# Patient Record
Sex: MALE | Race: WHITE | NOT HISPANIC OR LATINO | Employment: OTHER | URBAN - NONMETROPOLITAN AREA
[De-identification: names, ages, dates, MRNs, and addresses within clinical notes are randomized per-mention and may not be internally consistent; named-entity substitution may affect disease eponyms.]

---

## 2017-07-15 ENCOUNTER — HOSPITAL ENCOUNTER (EMERGENCY)
Facility: HOSPITAL | Age: 52
Discharge: HOME | End: 2017-07-15
Attending: FAMILY MEDICINE | Admitting: FAMILY MEDICINE

## 2017-07-15 VITALS
DIASTOLIC BLOOD PRESSURE: 88 MMHG | HEART RATE: 86 BPM | BODY MASS INDEX: 33.34 KG/M2 | WEIGHT: 220 LBS | TEMPERATURE: 98.2 F | RESPIRATION RATE: 18 BRPM | SYSTOLIC BLOOD PRESSURE: 126 MMHG | HEIGHT: 68 IN | OXYGEN SATURATION: 97 %

## 2017-07-15 DIAGNOSIS — R56.9 SEIZURE (HCC): Primary | ICD-10-CM

## 2017-07-15 LAB
ALBUMIN SERPL-MCNC: 4.2 G/DL (ref 3.4–4.8)
ALBUMIN/GLOB SERPL: 1.3 G/DL (ref 1.1–1.8)
ALP SERPL-CCNC: 105 U/L (ref 38–126)
ALT SERPL W P-5'-P-CCNC: 37 U/L (ref 21–72)
ANION GAP SERPL CALCULATED.3IONS-SCNC: 8 MMOL/L (ref 5–15)
AST SERPL-CCNC: 33 U/L (ref 17–59)
BASOPHILS # BLD AUTO: 0.04 10*3/MM3 (ref 0–0.2)
BASOPHILS NFR BLD AUTO: 0.2 % (ref 0–2)
BILIRUB SERPL-MCNC: 0.3 MG/DL (ref 0.2–1.3)
BILIRUB UR QL STRIP: NEGATIVE
BUN BLD-MCNC: 3 MG/DL (ref 7–21)
BUN/CREAT SERPL: 3 (ref 7–25)
CALCIUM SPEC-SCNC: 8.7 MG/DL (ref 8.4–10.2)
CHLORIDE SERPL-SCNC: 102 MMOL/L (ref 95–110)
CK SERPL-CCNC: 210 U/L (ref 55–170)
CLARITY UR: CLEAR
CO2 SERPL-SCNC: 27 MMOL/L (ref 22–31)
COLOR UR: YELLOW
CREAT BLD-MCNC: 1.01 MG/DL (ref 0.7–1.3)
DEPRECATED RDW RBC AUTO: 44.5 FL (ref 35.1–43.9)
EOSINOPHIL # BLD AUTO: 0.27 10*3/MM3 (ref 0–0.7)
EOSINOPHIL NFR BLD AUTO: 1.6 % (ref 0–7)
ERYTHROCYTE [DISTWIDTH] IN BLOOD BY AUTOMATED COUNT: 13.7 % (ref 11.5–14.5)
GFR SERPL CREATININE-BSD FRML MDRD: 78 ML/MIN/1.73 (ref 56–130)
GLOBULIN UR ELPH-MCNC: 3.3 GM/DL (ref 2.3–3.5)
GLUCOSE BLD-MCNC: 104 MG/DL (ref 60–100)
GLUCOSE UR STRIP-MCNC: NEGATIVE MG/DL
HCT VFR BLD AUTO: 42.7 % (ref 39–49)
HGB BLD-MCNC: 15.1 G/DL (ref 13.7–17.3)
HGB UR QL STRIP.AUTO: NEGATIVE
IMM GRANULOCYTES # BLD: 0.06 10*3/MM3 (ref 0–0.02)
IMM GRANULOCYTES NFR BLD: 0.4 % (ref 0–0.5)
KETONES UR QL STRIP: NEGATIVE
LEUKOCYTE ESTERASE UR QL STRIP.AUTO: NEGATIVE
LYMPHOCYTES # BLD AUTO: 2.5 10*3/MM3 (ref 0.6–4.2)
LYMPHOCYTES NFR BLD AUTO: 15.1 % (ref 10–50)
MCH RBC QN AUTO: 31.2 PG (ref 26.5–34)
MCHC RBC AUTO-ENTMCNC: 35.4 G/DL (ref 31.5–36.3)
MCV RBC AUTO: 88.2 FL (ref 80–98)
MONOCYTES # BLD AUTO: 1.08 10*3/MM3 (ref 0–0.9)
MONOCYTES NFR BLD AUTO: 6.5 % (ref 0–12)
NEUTROPHILS # BLD AUTO: 12.66 10*3/MM3 (ref 2–8.6)
NEUTROPHILS NFR BLD AUTO: 76.2 % (ref 37–80)
NITRITE UR QL STRIP: NEGATIVE
PH UR STRIP.AUTO: 6 [PH] (ref 5–9)
PLATELET # BLD AUTO: 264 10*3/MM3 (ref 150–450)
PMV BLD AUTO: 9.8 FL (ref 8–12)
POTASSIUM BLD-SCNC: 3.3 MMOL/L (ref 3.5–5.1)
PROT SERPL-MCNC: 7.5 G/DL (ref 6.3–8.6)
PROT UR QL STRIP: NEGATIVE
RBC # BLD AUTO: 4.84 10*6/MM3 (ref 4.37–5.74)
SODIUM BLD-SCNC: 137 MMOL/L (ref 137–145)
SP GR UR STRIP: 1.01 (ref 1–1.03)
UROBILINOGEN UR QL STRIP: NORMAL
WBC NRBC COR # BLD: 16.61 10*3/MM3 (ref 3.2–9.8)

## 2017-07-15 PROCEDURE — 81003 URINALYSIS AUTO W/O SCOPE: CPT | Performed by: FAMILY MEDICINE

## 2017-07-15 PROCEDURE — 82550 ASSAY OF CK (CPK): CPT | Performed by: FAMILY MEDICINE

## 2017-07-15 PROCEDURE — 80053 COMPREHEN METABOLIC PANEL: CPT | Performed by: FAMILY MEDICINE

## 2017-07-15 PROCEDURE — 85025 COMPLETE CBC W/AUTO DIFF WBC: CPT | Performed by: FAMILY MEDICINE

## 2017-07-15 PROCEDURE — 96374 THER/PROPH/DIAG INJ IV PUSH: CPT

## 2017-07-15 PROCEDURE — 99284 EMERGENCY DEPT VISIT MOD MDM: CPT

## 2017-07-15 PROCEDURE — 25010000002 LEVETIRACETAM IN NACL 0.82% 500 MG/100ML SOLUTION: Performed by: FAMILY MEDICINE

## 2017-07-15 PROCEDURE — 96361 HYDRATE IV INFUSION ADD-ON: CPT

## 2017-07-15 RX ORDER — LEVETIRACETAM 5 MG/ML
500 INJECTION INTRAVASCULAR EVERY 12 HOURS SCHEDULED
Status: DISCONTINUED | OUTPATIENT
Start: 2017-07-15 | End: 2017-07-15 | Stop reason: HOSPADM

## 2017-07-15 RX ORDER — LISINOPRIL 10 MG/1
10 TABLET ORAL DAILY
COMMUNITY
End: 2020-04-17

## 2017-07-15 RX ORDER — LEVETIRACETAM 500 MG/1
500 TABLET ORAL 2 TIMES DAILY
Qty: 60 TABLET | Refills: 0 | Status: SHIPPED | OUTPATIENT
Start: 2017-07-15

## 2017-07-15 RX ADMIN — LEVETIRACETAM 500 MG: 5 INJECTION INTRAVENOUS at 14:25

## 2017-07-15 RX ADMIN — SODIUM CHLORIDE 1000 ML: 9 INJECTION, SOLUTION INTRAVENOUS at 14:25

## 2017-07-15 NOTE — ED PROVIDER NOTES
Subjective   HPI Comments: Pt with seizure history and had Keppra stopped around 8 months ago by PCP since patient was not having any more seizures.     Patient is a 52 y.o. male presenting with seizures.   Seizures   Seizure activity on arrival: no    Seizure type:  Grand mal  Preceding symptoms: dizziness and vision change    Initial focality:  None  Episode characteristics: abnormal movements and tongue biting    Postictal symptoms: memory loss (during seizure)    Postictal symptoms: no confusion and no somnolence    Return to baseline: yes    Severity:  Mild  Timing:  Once  Number of seizures this episode:  1  Progression:  Resolved  Context: not alcohol withdrawal, not drug use, not fever and not previous head injury        Review of Systems   Constitutional: Negative for appetite change, chills, diaphoresis, fatigue and fever.   HENT: Negative for congestion, ear discharge, ear pain, nosebleeds, rhinorrhea, sinus pressure, sore throat and trouble swallowing.    Eyes: Negative for discharge and redness.   Respiratory: Negative for apnea, cough, chest tightness, shortness of breath and wheezing.    Cardiovascular: Negative for chest pain.   Gastrointestinal: Negative for abdominal pain, diarrhea, nausea and vomiting.   Endocrine: Negative for polyuria.   Genitourinary: Negative for dysuria, frequency and urgency.   Musculoskeletal: Negative for myalgias and neck pain.   Skin: Negative for color change and rash.   Allergic/Immunologic: Negative for immunocompromised state.   Neurological: Positive for seizures. Negative for dizziness, syncope, weakness, light-headedness and headaches.   Hematological: Negative for adenopathy. Does not bruise/bleed easily.   Psychiatric/Behavioral: Negative for behavioral problems and confusion.   All other systems reviewed and are negative.      Past Medical History:   Diagnosis Date   • Hyperlipidemia    • Hypertension    • Seizures        Allergies   Allergen Reactions   •  Gabapentin    • Hydrocodone    • Simvastatin        Past Surgical History:   Procedure Laterality Date   • BRAIN SURGERY         History reviewed. No pertinent family history.    Social History     Social History   • Marital status:      Spouse name: N/A   • Number of children: N/A   • Years of education: N/A     Social History Main Topics   • Smoking status: Current Every Day Smoker     Packs/day: 0.50     Types: Cigarettes   • Smokeless tobacco: None   • Alcohol use No   • Drug use: No   • Sexual activity: Defer     Other Topics Concern   • None     Social History Narrative    Pt lives by himself, unemployed           Objective   Physical Exam   Constitutional: He is oriented to person, place, and time. He appears well-developed and well-nourished.   HENT:   Head: Normocephalic and atraumatic.   Nose: Nose normal.   Mouth/Throat: Oropharynx is clear and moist.   Eyes: Conjunctivae and EOM are normal. Pupils are equal, round, and reactive to light. Right eye exhibits no discharge. Left eye exhibits no discharge. No scleral icterus.   Neck: Normal range of motion. Neck supple. No tracheal deviation present.   Cardiovascular: Normal rate, regular rhythm and normal heart sounds.    No murmur heard.  Pulmonary/Chest: Effort normal and breath sounds normal. No stridor. No respiratory distress. He has no wheezes. He has no rales.   Abdominal: Soft. Bowel sounds are normal. He exhibits no distension and no mass. There is no tenderness. There is no rebound and no guarding.   Musculoskeletal: He exhibits no edema.   Neurological: He is alert and oriented to person, place, and time. Coordination normal.   Skin: Skin is warm and dry. No rash noted. No erythema.   Psychiatric: He has a normal mood and affect. His behavior is normal. Thought content normal.   Nursing note and vitals reviewed.      Procedures         ED Course  ED Course        Labs Reviewed   COMPREHENSIVE METABOLIC PANEL - Abnormal; Notable for the  following:        Result Value    Glucose 104 (*)     BUN 3 (*)     Potassium 3.3 (*)     BUN/Creatinine Ratio 3.0 (*)     All other components within normal limits   CK - Abnormal; Notable for the following:     Creatine Kinase 210 (*)     All other components within normal limits   CBC WITH AUTO DIFFERENTIAL - Abnormal; Notable for the following:     WBC 16.61 (*)     RDW-SD 44.5 (*)     Neutrophils, Absolute 12.66 (*)     Monocytes, Absolute 1.08 (*)     Immature Grans, Absolute 0.06 (*)     All other components within normal limits   URINALYSIS W/ CULTURE IF INDICATED - Normal    Narrative:     Urine microscopic not indicated.   CBC AND DIFFERENTIAL    Narrative:     The following orders were created for panel order CBC & Differential.  Procedure                               Abnormality         Status                     ---------                               -----------         ------                     CBC Auto Differential[[id number]]        Abnormal            Final result                 Please view results for these tests on the individual orders.       No orders to display                   MDM    Final diagnoses:   Seizure            Dionicio Wayne MD  07/15/17 1551       Dionicio Wayne MD  07/15/17 1552       Dionicio Wayne MD  07/15/17 1558

## 2017-07-15 NOTE — ED TRIAGE NOTES
Pt presents to the ED via EMS with c/o seizure.  Per EMS pt was postictal upon arrival.  Pt is alert and oriented, but unable to remember much of the morning, or much of his personal medical history.  Pt has no complaints at this time.  Pt noted to be diaphoretic upon arrival..

## 2018-05-16 ENCOUNTER — HOSPITAL ENCOUNTER (EMERGENCY)
Facility: HOSPITAL | Age: 53
Discharge: HOME | End: 2018-05-16
Attending: EMERGENCY MEDICINE | Admitting: EMERGENCY MEDICINE

## 2018-05-16 ENCOUNTER — APPOINTMENT (OUTPATIENT)
Dept: CT IMAGING | Facility: HOSPITAL | Age: 53
End: 2018-05-16

## 2018-05-16 VITALS
DIASTOLIC BLOOD PRESSURE: 94 MMHG | HEART RATE: 75 BPM | SYSTOLIC BLOOD PRESSURE: 139 MMHG | OXYGEN SATURATION: 98 % | BODY MASS INDEX: 30.31 KG/M2 | HEIGHT: 68 IN | TEMPERATURE: 98 F | RESPIRATION RATE: 18 BRPM | WEIGHT: 200 LBS

## 2018-05-16 DIAGNOSIS — R25.1 TREMOR: ICD-10-CM

## 2018-05-16 DIAGNOSIS — E87.6 HYPOKALEMIA: ICD-10-CM

## 2018-05-16 DIAGNOSIS — G24.01 TARDIVE DYSKINESIA: Primary | ICD-10-CM

## 2018-05-16 LAB
ALBUMIN SERPL-MCNC: 4.5 G/DL (ref 3.4–4.8)
ALBUMIN/GLOB SERPL: 1.3 G/DL (ref 1.1–1.8)
ALP SERPL-CCNC: 105 U/L (ref 38–126)
ALT SERPL W P-5'-P-CCNC: 42 U/L (ref 21–72)
AMPHET+METHAMPHET UR QL: NEGATIVE
ANION GAP SERPL CALCULATED.3IONS-SCNC: 11 MMOL/L (ref 5–15)
APAP SERPL-MCNC: <10 MCG/ML (ref 10–30)
AST SERPL-CCNC: 33 U/L (ref 17–59)
BARBITURATES UR QL SCN: NEGATIVE
BASOPHILS # BLD AUTO: 0.04 10*3/MM3 (ref 0–0.2)
BASOPHILS NFR BLD AUTO: 0.5 % (ref 0–2)
BENZODIAZ UR QL SCN: NEGATIVE
BILIRUB SERPL-MCNC: 0.6 MG/DL (ref 0.2–1.3)
BILIRUB UR QL STRIP: NEGATIVE
BUN BLD-MCNC: 4 MG/DL (ref 7–21)
BUN/CREAT SERPL: 4.5 (ref 7–25)
CALCIUM SPEC-SCNC: 9.3 MG/DL (ref 8.4–10.2)
CANNABINOIDS SERPL QL: NEGATIVE
CHLORIDE SERPL-SCNC: 99 MMOL/L (ref 95–110)
CLARITY UR: CLEAR
CO2 SERPL-SCNC: 32 MMOL/L (ref 22–31)
COCAINE UR QL: NEGATIVE
COLOR UR: YELLOW
CREAT BLD-MCNC: 0.88 MG/DL (ref 0.7–1.3)
DEPRECATED RDW RBC AUTO: 39.1 FL (ref 35.1–43.9)
EOSINOPHIL # BLD AUTO: 0.2 10*3/MM3 (ref 0–0.7)
EOSINOPHIL NFR BLD AUTO: 2.6 % (ref 0–7)
ERYTHROCYTE [DISTWIDTH] IN BLOOD BY AUTOMATED COUNT: 12.2 % (ref 11.5–14.5)
ETHANOL BLD-MCNC: <10 MG/DL (ref 0–10)
ETHANOL UR QL: <0.01 %
GFR SERPL CREATININE-BSD FRML MDRD: 91 ML/MIN/1.73 (ref 56–130)
GLOBULIN UR ELPH-MCNC: 3.4 GM/DL (ref 2.3–3.5)
GLUCOSE BLD-MCNC: 99 MG/DL (ref 60–100)
GLUCOSE UR STRIP-MCNC: NEGATIVE MG/DL
HCT VFR BLD AUTO: 45.3 % (ref 39–49)
HGB BLD-MCNC: 16.7 G/DL (ref 13.7–17.3)
HGB UR QL STRIP.AUTO: NEGATIVE
HOLD SPECIMEN: NORMAL
HOLD SPECIMEN: NORMAL
IMM GRANULOCYTES # BLD: 0.01 10*3/MM3 (ref 0–0.02)
IMM GRANULOCYTES NFR BLD: 0.1 % (ref 0–0.5)
KETONES UR QL STRIP: NEGATIVE
LEUKOCYTE ESTERASE UR QL STRIP.AUTO: NEGATIVE
LYMPHOCYTES # BLD AUTO: 2.67 10*3/MM3 (ref 0.6–4.2)
LYMPHOCYTES NFR BLD AUTO: 35.3 % (ref 10–50)
MAGNESIUM SERPL-MCNC: 2 MG/DL (ref 1.6–2.3)
MCH RBC QN AUTO: 32.6 PG (ref 26.5–34)
MCHC RBC AUTO-ENTMCNC: 36.9 G/DL (ref 31.5–36.3)
MCV RBC AUTO: 88.5 FL (ref 80–98)
METHADONE UR QL SCN: NEGATIVE
MONOCYTES # BLD AUTO: 0.51 10*3/MM3 (ref 0–0.9)
MONOCYTES NFR BLD AUTO: 6.7 % (ref 0–12)
NEUTROPHILS # BLD AUTO: 4.13 10*3/MM3 (ref 2–8.6)
NEUTROPHILS NFR BLD AUTO: 54.8 % (ref 37–80)
NITRITE UR QL STRIP: NEGATIVE
OPIATES UR QL: NEGATIVE
OXYCODONE UR QL SCN: NEGATIVE
PH UR STRIP.AUTO: 7 [PH] (ref 5–9)
PLATELET # BLD AUTO: 238 10*3/MM3 (ref 150–450)
PMV BLD AUTO: 11.1 FL (ref 8–12)
POTASSIUM BLD-SCNC: 3 MMOL/L (ref 3.5–5.1)
PROT SERPL-MCNC: 7.9 G/DL (ref 6.3–8.6)
PROT UR QL STRIP: NEGATIVE
RBC # BLD AUTO: 5.12 10*6/MM3 (ref 4.37–5.74)
SALICYLATES SERPL-MCNC: <1 MG/DL (ref 10–20)
SODIUM BLD-SCNC: 142 MMOL/L (ref 137–145)
SP GR UR STRIP: 1 (ref 1–1.03)
T4 FREE SERPL-MCNC: 0.98 NG/DL (ref 0.78–2.19)
TSH SERPL DL<=0.05 MIU/L-ACNC: 2.72 MIU/ML (ref 0.46–4.68)
UROBILINOGEN UR QL STRIP: NORMAL
WBC NRBC COR # BLD: 7.56 10*3/MM3 (ref 3.2–9.8)
WHOLE BLOOD HOLD SPECIMEN: NORMAL
WHOLE BLOOD HOLD SPECIMEN: NORMAL

## 2018-05-16 PROCEDURE — 96372 THER/PROPH/DIAG INJ SC/IM: CPT

## 2018-05-16 PROCEDURE — 80307 DRUG TEST PRSMV CHEM ANLYZR: CPT | Performed by: EMERGENCY MEDICINE

## 2018-05-16 PROCEDURE — 84439 ASSAY OF FREE THYROXINE: CPT | Performed by: EMERGENCY MEDICINE

## 2018-05-16 PROCEDURE — 81003 URINALYSIS AUTO W/O SCOPE: CPT | Performed by: EMERGENCY MEDICINE

## 2018-05-16 PROCEDURE — 70450 CT HEAD/BRAIN W/O DYE: CPT

## 2018-05-16 PROCEDURE — 84443 ASSAY THYROID STIM HORMONE: CPT | Performed by: EMERGENCY MEDICINE

## 2018-05-16 PROCEDURE — 25010000002 BENZTROPINE MESYLATE PER 1 MG: Performed by: EMERGENCY MEDICINE

## 2018-05-16 PROCEDURE — 83735 ASSAY OF MAGNESIUM: CPT | Performed by: EMERGENCY MEDICINE

## 2018-05-16 PROCEDURE — 85025 COMPLETE CBC W/AUTO DIFF WBC: CPT | Performed by: EMERGENCY MEDICINE

## 2018-05-16 PROCEDURE — 80053 COMPREHEN METABOLIC PANEL: CPT | Performed by: EMERGENCY MEDICINE

## 2018-05-16 PROCEDURE — 99284 EMERGENCY DEPT VISIT MOD MDM: CPT

## 2018-05-16 RX ORDER — POTASSIUM CHLORIDE 750 MG/1
40 CAPSULE, EXTENDED RELEASE ORAL ONCE
Status: COMPLETED | OUTPATIENT
Start: 2018-05-16 | End: 2018-05-16

## 2018-05-16 RX ORDER — BENZTROPINE MESYLATE 1 MG/ML
2 INJECTION INTRAMUSCULAR; INTRAVENOUS ONCE
Status: COMPLETED | OUTPATIENT
Start: 2018-05-16 | End: 2018-05-16

## 2018-05-16 RX ORDER — BENZTROPINE MESYLATE 1 MG/1
1 TABLET ORAL 2 TIMES DAILY PRN
Qty: 20 TABLET | Refills: 0 | Status: SHIPPED | OUTPATIENT
Start: 2018-05-16 | End: 2020-04-06 | Stop reason: SDUPTHER

## 2018-05-16 RX ORDER — SODIUM CHLORIDE 0.9 % (FLUSH) 0.9 %
10 SYRINGE (ML) INJECTION AS NEEDED
Status: DISCONTINUED | OUTPATIENT
Start: 2018-05-16 | End: 2018-05-16 | Stop reason: HOSPADM

## 2018-05-16 RX ADMIN — POTASSIUM CHLORIDE 40 MEQ: 750 CAPSULE, EXTENDED RELEASE ORAL at 11:47

## 2018-05-16 RX ADMIN — BENZTROPINE MESYLATE 2 MG: 1 INJECTION INTRAMUSCULAR; INTRAVENOUS at 10:19

## 2018-05-16 NOTE — ED NOTES
Patient states that he has a history of seizures, patient states his last seizure was 4 months ago. Patient reports his tremors began 3 days ago. Patient also states he doesn't know if the tremors happened previously before or not.     Evelin Pascual RN  05/16/18 1072

## 2018-05-16 NOTE — ED PROVIDER NOTES
Subjective   Patient presents with 3 days of a tremor.  Patient notes these and the face tongue and bilateral extremity right greater than left.  Patient states that he is on several psychiatric medications, we do not have a complete list here.  Patient [facility] a [facility] Baltazar [facility] [place] [facility]  At phone #[phone].  This is evidently the patient's psychiatrist.  The patient states his physician referred him to the ED for further evaluation.  Patient denies any nausea vomiting fevers chills bowel or bladder problems on his current regime.  Patient notes no recent changes.            Review of Systems   Constitutional: Negative.  Negative for appetite change, chills and fever.   HENT: Negative.  Negative for congestion.    Eyes: Negative.  Negative for photophobia and visual disturbance.   Respiratory: Negative.  Negative for cough, chest tightness and shortness of breath.    Cardiovascular: Negative.  Negative for chest pain and palpitations.   Gastrointestinal: Negative.  Negative for abdominal pain, constipation, diarrhea, nausea and vomiting.   Endocrine: Negative.    Genitourinary: Negative.  Negative for decreased urine volume, dysuria, flank pain and hematuria.   Musculoskeletal: Negative.  Negative for arthralgias, back pain, myalgias, neck pain and neck stiffness.   Skin: Negative.  Negative for pallor.   Neurological: Positive for tremors. Negative for dizziness, syncope, weakness, light-headedness, numbness and headaches.   Psychiatric/Behavioral: Negative.  Negative for confusion and suicidal ideas. The patient is not nervous/anxious.        Past Medical History:   Diagnosis Date   • Hyperlipidemia    • Hypertension    • Seizures        Allergies   Allergen Reactions   • Gabapentin    • Hydrocodone    • Simvastatin        Past Surgical History:   Procedure Laterality Date   • BRAIN SURGERY         History reviewed. No pertinent family history.    Social History     Social History   • Marital  status: Single     Social History Main Topics   • Smoking status: Current Every Day Smoker     Packs/day: 0.50     Types: Cigarettes   • Alcohol use No   • Drug use: No   • Sexual activity: Defer     Other Topics Concern   • Not on file     Social History Narrative    Pt lives by himself, unemployed           Objective   Physical Exam   Constitutional: He is oriented to person, place, and time. He appears well-developed and well-nourished. No distress.   HENT:   Head: Normocephalic and atraumatic.   Eyes: Conjunctivae and EOM are normal.   Neck: Normal range of motion. Neck supple. No JVD present.   Cardiovascular: Normal rate, regular rhythm, normal heart sounds and intact distal pulses.  Exam reveals no gallop and no friction rub.    No murmur heard.  Pulmonary/Chest: Effort normal. No respiratory distress. He has no wheezes. He has no rales. He exhibits no tenderness.   Abdominal: Soft. Bowel sounds are normal. He exhibits no distension and no mass. There is no tenderness. There is no rebound and no guarding.   Musculoskeletal: He exhibits no tenderness or deformity.   She will with consistent tremor of the right and left upper extremity, right greater than left.  Patient also with a a repetitive the tongue tremor and the right greater than left side of the facial tremor.  Patient does have purposeful motion without neurological deficits.   Neurological: He is alert and oriented to person, place, and time.   Skin: Skin is warm and dry.   Psychiatric: He has a normal mood and affect. His behavior is normal. Judgment and thought content normal.   Nursing note and vitals reviewed.      Procedures           ED Course      Labs Reviewed   COMPREHENSIVE METABOLIC PANEL - Abnormal; Notable for the following:        Result Value    BUN 4 (*)     Potassium 3.0 (*)     CO2 32.0 (*)     BUN/Creatinine Ratio 4.5 (*)     All other components within normal limits   CBC WITH AUTO DIFFERENTIAL - Abnormal; Notable for the  following:     MCHC 36.9 (*)     All other components within normal limits   ACETAMINOPHEN LEVEL - Abnormal; Notable for the following:     Acetaminophen <10.0 (*)     All other components within normal limits   SALICYLATE LEVEL - Abnormal; Notable for the following:     Salicylate <1.0 (*)     All other components within normal limits   MAGNESIUM - Normal   TSH - Normal   T4, FREE - Normal   URINALYSIS W/ CULTURE IF INDICATED - Normal    Narrative:     Urine microscopic not indicated.   URINE DRUG SCREEN - Normal    Narrative:     Negative Thresholds For Drugs Screened in Urine:     Amphetamines          500 ng/ml  Barbiturates          200 ng/ml  Benzodiazepines       200 ng/ml  Cocaine               150 ng/ml  Methadone             300 ng/mL  Opiates               300 ng/mL  Oxycodone             100 ng/mL  THC                   20 ng/mL    The normal value for all drugs tested is negative. This report includes final unconfirmed screening results.  A positive result by this assay can be, at your request, sent to the Reference Lab for confirmation by gas chromatography. Unconfirmed results must not be used for non-medical purposes, such as employment or legal testing. Clinical consideration should be applied to any drug of abuse test result, particularly when unconfirmed results are used.   RAINBOW DRAW    Narrative:     The following orders were created for panel order [place] Draw.  Procedure                               Abnormality         Status                     ---------                               -----------         ------                     Light Blue Top[[id number]]                                   Final result               Green Top (Gel)[[id number]]                                  Final result               Lavender Top[[id number]]                                     Final result               Gold Top - SST[[id number]]                                   Final result                 Please view  results for these tests on the individual orders.   ETHANOL   CBC AND DIFFERENTIAL    Narrative:     The following orders were created for panel order CBC & Differential.  Procedure                               Abnormality         Status                     ---------                               -----------         ------                     CBC Auto Differential[[id number]]        Abnormal            Final result                 Please view results for these tests on the individual orders.   LIGHT BLUE TOP   GREEN TOP   LAVENDER TOP   GOLD TOP - SST       CT Head Without Contrast   Final Result   CONCLUSION:   No acute process.   Minimal cerebral atrophy.      [id number]      Electronically signed by:  Amauri Mcneill MD  5/16/2018 1:13 PM CDT   Workstation: [email or web address]          [facility], in discussion with Dr. Seth Colby, [phone].  The suggest medication titration which will happen on Wednesday, the patient has appointment [facility].  In the meantime patient will be called by Hailey this afternoon to try to schedule an earlier appointment time and.  Patient also be started on low-dose Cogentin for the symptoms to help give him rest.  They will also arrange neurology follow-up with the patient's symptoms.            [facility]      Final diagnoses:   Tardive dyskinesia   Tremor   Hypokalemia            Danny Story MD  05/16/18 3750

## 2018-05-16 NOTE — DISCHARGE INSTRUCTIONS
Your case was discussed with Dr. Colby and his assistant, Hailey.  She will be calling you to schedule an appointment time.  It would like you to continue current medications.  They would like you to start the new medication, Cogentin, to help with the tremors.  They will also be arranging neurology consultation and follow-up for you.

## 2018-10-07 ENCOUNTER — APPOINTMENT (OUTPATIENT)
Dept: CT IMAGING | Facility: HOSPITAL | Age: 53
End: 2018-10-07

## 2018-10-07 ENCOUNTER — HOSPITAL ENCOUNTER (EMERGENCY)
Facility: HOSPITAL | Age: 53
Discharge: HOME | End: 2018-10-07
Attending: EMERGENCY MEDICINE | Admitting: EMERGENCY MEDICINE

## 2018-10-07 VITALS
HEART RATE: 75 BPM | DIASTOLIC BLOOD PRESSURE: 97 MMHG | WEIGHT: 198 LBS | SYSTOLIC BLOOD PRESSURE: 165 MMHG | OXYGEN SATURATION: 97 % | RESPIRATION RATE: 18 BRPM | TEMPERATURE: 97.8 F | HEIGHT: 69 IN | BODY MASS INDEX: 29.33 KG/M2

## 2018-10-07 DIAGNOSIS — R25.1 TREMOR: Primary | ICD-10-CM

## 2018-10-07 DIAGNOSIS — R42 DIZZINESS: ICD-10-CM

## 2018-10-07 LAB
ALBUMIN SERPL-MCNC: 3.9 G/DL (ref 3.4–4.8)
ALBUMIN/GLOB SERPL: 1.3 G/DL (ref 1.1–1.8)
ALP SERPL-CCNC: 100 U/L (ref 38–126)
ALT SERPL W P-5'-P-CCNC: 41 U/L (ref 21–72)
AMPHET+METHAMPHET UR QL: NEGATIVE
ANION GAP SERPL CALCULATED.3IONS-SCNC: 11 MMOL/L (ref 5–15)
APAP SERPL-MCNC: <10 MCG/ML (ref 10–30)
AST SERPL-CCNC: 43 U/L (ref 17–59)
BARBITURATES UR QL SCN: NEGATIVE
BASOPHILS # BLD AUTO: 0.06 10*3/MM3 (ref 0–0.2)
BASOPHILS NFR BLD AUTO: 0.6 % (ref 0–2)
BENZODIAZ UR QL SCN: POSITIVE
BILIRUB SERPL-MCNC: 0.7 MG/DL (ref 0.2–1.3)
BILIRUB UR QL STRIP: NEGATIVE
BUN BLD-MCNC: <2 MG/DL (ref 7–21)
BUN/CREAT SERPL: ABNORMAL (ref 7–25)
CALCIUM SPEC-SCNC: 8.7 MG/DL (ref 8.4–10.2)
CANNABINOIDS SERPL QL: NEGATIVE
CHLORIDE SERPL-SCNC: 94 MMOL/L (ref 95–110)
CLARITY UR: CLEAR
CO2 SERPL-SCNC: 34 MMOL/L (ref 22–31)
COCAINE UR QL: NEGATIVE
COLOR UR: YELLOW
CREAT BLD-MCNC: 0.98 MG/DL (ref 0.7–1.3)
DEPRECATED RDW RBC AUTO: 41.3 FL (ref 35.1–43.9)
EOSINOPHIL # BLD AUTO: 0.56 10*3/MM3 (ref 0–0.7)
EOSINOPHIL NFR BLD AUTO: 5.7 % (ref 0–7)
ERYTHROCYTE [DISTWIDTH] IN BLOOD BY AUTOMATED COUNT: 12.8 % (ref 11.5–14.5)
ETHANOL BLD-MCNC: <10 MG/DL (ref 0–10)
ETHANOL UR QL: <0.01 %
GFR SERPL CREATININE-BSD FRML MDRD: 80 ML/MIN/1.73 (ref 56–130)
GLOBULIN UR ELPH-MCNC: 3 GM/DL (ref 2.3–3.5)
GLUCOSE BLD-MCNC: 127 MG/DL (ref 60–100)
GLUCOSE UR STRIP-MCNC: NEGATIVE MG/DL
HCT VFR BLD AUTO: 45.9 % (ref 39–49)
HGB BLD-MCNC: 16.4 G/DL (ref 13.7–17.3)
HGB UR QL STRIP.AUTO: NEGATIVE
HOLD SPECIMEN: NORMAL
HOLD SPECIMEN: NORMAL
IMM GRANULOCYTES # BLD: 0.01 10*3/MM3 (ref 0–0.02)
IMM GRANULOCYTES NFR BLD: 0.1 % (ref 0–0.5)
KETONES UR QL STRIP: NEGATIVE
LEUKOCYTE ESTERASE UR QL STRIP.AUTO: NEGATIVE
LYMPHOCYTES # BLD AUTO: 3.16 10*3/MM3 (ref 0.6–4.2)
LYMPHOCYTES NFR BLD AUTO: 31.9 % (ref 10–50)
MCH RBC QN AUTO: 31.4 PG (ref 26.5–34)
MCHC RBC AUTO-ENTMCNC: 35.7 G/DL (ref 31.5–36.3)
MCV RBC AUTO: 87.9 FL (ref 80–98)
METHADONE UR QL SCN: NEGATIVE
MONOCYTES # BLD AUTO: 0.42 10*3/MM3 (ref 0–0.9)
MONOCYTES NFR BLD AUTO: 4.2 % (ref 0–12)
NEUTROPHILS # BLD AUTO: 5.7 10*3/MM3 (ref 2–8.6)
NEUTROPHILS NFR BLD AUTO: 57.5 % (ref 37–80)
NITRITE UR QL STRIP: NEGATIVE
NRBC BLD MANUAL-RTO: 0 /100 WBC (ref 0–0)
OPIATES UR QL: NEGATIVE
OXYCODONE UR QL SCN: NEGATIVE
PH UR STRIP.AUTO: 7 [PH] (ref 5–9)
PLATELET # BLD AUTO: 246 10*3/MM3 (ref 150–450)
PMV BLD AUTO: 10.4 FL (ref 8–12)
POTASSIUM BLD-SCNC: 2.6 MMOL/L (ref 3.5–5.1)
PROT SERPL-MCNC: 6.9 G/DL (ref 6.3–8.6)
PROT UR QL STRIP: NEGATIVE
RBC # BLD AUTO: 5.22 10*6/MM3 (ref 4.37–5.74)
SALICYLATES SERPL-MCNC: <1 MG/DL (ref 10–20)
SODIUM BLD-SCNC: 139 MMOL/L (ref 137–145)
SP GR UR STRIP: 1.01 (ref 1–1.03)
UROBILINOGEN UR QL STRIP: NORMAL
WBC NRBC COR # BLD: 9.91 10*3/MM3 (ref 3.2–9.8)
WHOLE BLOOD HOLD SPECIMEN: NORMAL
WHOLE BLOOD HOLD SPECIMEN: NORMAL

## 2018-10-07 PROCEDURE — 80307 DRUG TEST PRSMV CHEM ANLYZR: CPT | Performed by: EMERGENCY MEDICINE

## 2018-10-07 PROCEDURE — 99283 EMERGENCY DEPT VISIT LOW MDM: CPT

## 2018-10-07 PROCEDURE — 70450 CT HEAD/BRAIN W/O DYE: CPT

## 2018-10-07 PROCEDURE — 81003 URINALYSIS AUTO W/O SCOPE: CPT | Performed by: EMERGENCY MEDICINE

## 2018-10-07 PROCEDURE — 85025 COMPLETE CBC W/AUTO DIFF WBC: CPT | Performed by: EMERGENCY MEDICINE

## 2018-10-07 PROCEDURE — 80053 COMPREHEN METABOLIC PANEL: CPT | Performed by: EMERGENCY MEDICINE

## 2018-10-07 RX ORDER — LORAZEPAM 1 MG/1
2 TABLET ORAL ONCE
Status: COMPLETED | OUTPATIENT
Start: 2018-10-07 | End: 2018-10-07

## 2018-10-07 RX ORDER — MECLIZINE HYDROCHLORIDE 25 MG/1
50 TABLET ORAL ONCE
Status: COMPLETED | OUTPATIENT
Start: 2018-10-07 | End: 2018-10-07

## 2018-10-07 RX ORDER — LORAZEPAM 1 MG/1
1 TABLET ORAL 2 TIMES DAILY PRN
Qty: 12 TABLET | Refills: 0 | Status: SHIPPED | OUTPATIENT
Start: 2018-10-07

## 2018-10-07 RX ORDER — LORAZEPAM 2 MG/ML
1 INJECTION INTRAMUSCULAR ONCE
Status: DISCONTINUED | OUTPATIENT
Start: 2018-10-07 | End: 2018-10-07

## 2018-10-07 RX ORDER — POTASSIUM CHLORIDE 750 MG/1
40 CAPSULE, EXTENDED RELEASE ORAL ONCE
Status: COMPLETED | OUTPATIENT
Start: 2018-10-07 | End: 2018-10-07

## 2018-10-07 RX ADMIN — LORAZEPAM 2 MG: 1 TABLET ORAL at 15:36

## 2018-10-07 RX ADMIN — POTASSIUM CHLORIDE 40 MEQ: 750 CAPSULE, EXTENDED RELEASE ORAL at 16:06

## 2018-10-07 RX ADMIN — MECLIZINE HYDROCHLORIDE 50 MG: 25 TABLET ORAL at 15:37

## 2018-10-07 NOTE — ED PROVIDER NOTES
Subjective   53-year-old male with a history of seizure disorder and benign tremor comes emergency Department complaining of dizziness and worsening tremor.  Patient states he is under the care of a neurologist and is scheduled to see him in November.  He says that over the past few months she's noticed that the tremor in his right upper extremity is now worse than it was before.  He says keeping him awake at night.  He also has been having dizziness that he describes as spinning.  He is able to walk okay but he still has the feeling.  He's had nausea but no vomiting.  Denies any chest pains or shortness of breath.  He denies any headaches.        Dizziness   Quality:  Vertigo  Severity:  Moderate  Onset quality:  Sudden  Timing:  Constant  Progression:  Worsening  Chronicity:  Chronic  Relieved by:  Nothing  Worsened by:  Nothing  Associated symptoms: no chest pain, no nausea, no shortness of breath, no vomiting and no weakness        Review of Systems   Constitutional: Negative for chills, fatigue and fever.   HENT: Negative for congestion and sore throat.    Eyes: Negative for visual disturbance.   Respiratory: Negative for cough and shortness of breath.    Cardiovascular: Negative for chest pain and leg swelling.   Gastrointestinal: Negative for abdominal pain, nausea and vomiting.   Genitourinary: Negative for dysuria.   Musculoskeletal: Negative for back pain.   Skin: Negative for rash.   Neurological: Positive for dizziness. Negative for weakness.   Psychiatric/Behavioral: Negative for behavioral problems.   All other systems reviewed and are negative.      Past Medical History:   Diagnosis Date   • Depression    • Hyperlipidemia    • Hypertension    • Seizures (CMS/HCC)    • Tremors of nervous system        Allergies   Allergen Reactions   • Gabapentin    • Hydrocodone    • Simvastatin        Past Surgical History:   Procedure Laterality Date   • BACK SURGERY     • BRAIN SURGERY         History reviewed. No  pertinent family history.    Social History     Social History   • Marital status: Single     Social History Main Topics   • Smoking status: Current Every Day Smoker     Packs/day: 0.50     Types: Cigarettes      Comment: 3-4 ciggs per day   • Alcohol use No   • Drug use: No   • Sexual activity: Defer     Other Topics Concern   • Not on file     Social History Narrative    Pt lives by himself, unemployed           Objective   Physical Exam   Constitutional: He is oriented to person, place, and time. He appears well-developed and well-nourished.   HENT:   Head: Normocephalic and atraumatic.   Eyes: Pupils are equal, round, and reactive to light. EOM are normal.   Neck: Normal range of motion. Neck supple.   No midline tenderness   Cardiovascular: Normal rate, regular rhythm, normal heart sounds and intact distal pulses.  Exam reveals no gallop and no friction rub.    No murmur heard.  Pulmonary/Chest: Breath sounds normal. No respiratory distress. He has no wheezes. He has no rales.   No rhonchi   Abdominal: Soft. Bowel sounds are normal. He exhibits no distension. There is no tenderness.   Musculoskeletal: Normal range of motion. He exhibits no tenderness or deformity.   Neurological: He is alert and oriented to person, place, and time. No cranial nerve deficit. He exhibits normal muscle tone. Coordination normal.   Marked right upper extremity tremor.   Skin: Skin is warm and dry. No rash noted.   Psychiatric: He has a normal mood and affect. His behavior is normal.       Procedures           ED Course                  MDM  Number of Diagnoses or Management Options  Diagnosis management comments: 4:44 PM patient is improved from the dizziness standpoint but his tremor is unchanged.  Bloody discharge home with Ativan and instructed to follow-up with his neurologist as soon as possible for alteration his medication for his tremor.  We also repleted his low potassium and we'll send him home on several days of  potassium supplements to aid in restoring that to normal.       Amount and/or Complexity of Data Reviewed  Clinical lab tests: ordered and reviewed  Tests in the radiology section of CPT®: ordered and reviewed  Tests in the medicine section of CPT®: ordered and reviewed  Decide to obtain previous medical records or to obtain history from someone other than the patient: yes  Review and summarize past medical records: yes  Independent visualization of images, tracings, or specimens: yes    Risk of Complications, Morbidity, and/or Mortality  Presenting problems: high  Diagnostic procedures: high  Management options: high          Final diagnoses:   Tremor   Dizziness            Amauri Tatum MD  10/07/18 9782

## 2018-10-07 NOTE — ED NOTES
Lab called with potassium result of 2.6, Provider informed     Dinorah Simon, LPN  10/07/18 2964

## 2018-10-07 NOTE — ED NOTES
Patient reports having dizziness and tremors for past couple of days     Dinorah Simon, LPN  10/07/18 8667

## 2018-11-06 ENCOUNTER — TRANSCRIBE ORDERS (OUTPATIENT)
Dept: SLEEP MEDICINE | Facility: HOSPITAL | Age: 53
End: 2018-11-06

## 2018-11-06 DIAGNOSIS — G47.30 INSOMNIA WITH SLEEP APNEA: Primary | ICD-10-CM

## 2018-11-06 DIAGNOSIS — G47.33 OBSTRUCTIVE SLEEP APNEA: ICD-10-CM

## 2018-11-06 DIAGNOSIS — G47.00 INSOMNIA WITH SLEEP APNEA: Primary | ICD-10-CM

## 2018-11-20 ENCOUNTER — HOSPITAL ENCOUNTER (OUTPATIENT)
Dept: SLEEP MEDICINE | Facility: HOSPITAL | Age: 53
Discharge: HOME | End: 2018-11-20
Admitting: PSYCHIATRY & NEUROLOGY

## 2018-11-20 VITALS — HEIGHT: 69 IN | BODY MASS INDEX: 28.88 KG/M2 | WEIGHT: 195 LBS

## 2018-11-20 DIAGNOSIS — G47.00 INSOMNIA WITH SLEEP APNEA: ICD-10-CM

## 2018-11-20 DIAGNOSIS — G47.30 INSOMNIA WITH SLEEP APNEA: ICD-10-CM

## 2018-11-20 DIAGNOSIS — G47.33 OBSTRUCTIVE SLEEP APNEA: ICD-10-CM

## 2018-11-20 PROCEDURE — 95810 POLYSOM 6/> YRS 4/> PARAM: CPT

## 2018-11-20 PROCEDURE — 95810 POLYSOM 6/> YRS 4/> PARAM: CPT | Performed by: INTERNAL MEDICINE

## 2020-04-06 ENCOUNTER — HOSPITAL ENCOUNTER (EMERGENCY)
Facility: HOSPITAL | Age: 55
Discharge: HOME | End: 2020-04-06
Attending: FAMILY MEDICINE | Admitting: FAMILY MEDICINE

## 2020-04-06 VITALS
HEART RATE: 94 BPM | WEIGHT: 165 LBS | OXYGEN SATURATION: 94 % | RESPIRATION RATE: 20 BRPM | TEMPERATURE: 98.1 F | HEIGHT: 68 IN | DIASTOLIC BLOOD PRESSURE: 91 MMHG | SYSTOLIC BLOOD PRESSURE: 125 MMHG | BODY MASS INDEX: 25.01 KG/M2

## 2020-04-06 DIAGNOSIS — R25.1 TREMOR OF BOTH HANDS: Primary | ICD-10-CM

## 2020-04-06 DIAGNOSIS — E87.6 HYPOKALEMIA: ICD-10-CM

## 2020-04-06 LAB
ALBUMIN SERPL-MCNC: 3.7 G/DL (ref 3.5–5.2)
ALBUMIN/GLOB SERPL: 1.2 G/DL
ALP SERPL-CCNC: 133 U/L (ref 39–117)
ALT SERPL W P-5'-P-CCNC: 5 U/L (ref 1–41)
ANION GAP SERPL CALCULATED.3IONS-SCNC: 13 MMOL/L (ref 5–15)
AST SERPL-CCNC: 11 U/L (ref 1–40)
BASOPHILS # BLD AUTO: 0.1 10*3/MM3 (ref 0–0.2)
BASOPHILS NFR BLD AUTO: 1.2 % (ref 0–1.5)
BILIRUB SERPL-MCNC: 0.7 MG/DL (ref 0.2–1.2)
BUN BLD-MCNC: <2 MG/DL (ref 6–20)
BUN/CREAT SERPL: ABNORMAL
CALCIUM SPEC-SCNC: 9 MG/DL (ref 8.6–10.5)
CHLORIDE SERPL-SCNC: 95 MMOL/L (ref 98–107)
CK SERPL-CCNC: 132 U/L (ref 20–200)
CO2 SERPL-SCNC: 35 MMOL/L (ref 22–29)
CREAT BLD-MCNC: 0.8 MG/DL (ref 0.76–1.27)
DEPRECATED RDW RBC AUTO: 40.1 FL (ref 37–54)
EOSINOPHIL # BLD AUTO: 0.17 10*3/MM3 (ref 0–0.4)
EOSINOPHIL NFR BLD AUTO: 2 % (ref 0.3–6.2)
ERYTHROCYTE [DISTWIDTH] IN BLOOD BY AUTOMATED COUNT: 13.8 % (ref 12.3–15.4)
ETHANOL BLD-MCNC: <10 MG/DL (ref 0–10)
ETHANOL UR QL: <0.01 %
GFR SERPL CREATININE-BSD FRML MDRD: 101 ML/MIN/1.73
GLOBULIN UR ELPH-MCNC: 3.1 GM/DL
GLUCOSE BLD-MCNC: 124 MG/DL (ref 65–99)
HCT VFR BLD AUTO: 46.2 % (ref 37.5–51)
HGB BLD-MCNC: 15.7 G/DL (ref 13–17.7)
HOLD SPECIMEN: NORMAL
IMM GRANULOCYTES # BLD AUTO: 0.02 10*3/MM3 (ref 0–0.05)
IMM GRANULOCYTES NFR BLD AUTO: 0.2 % (ref 0–0.5)
LYMPHOCYTES # BLD AUTO: 2.15 10*3/MM3 (ref 0.7–3.1)
LYMPHOCYTES NFR BLD AUTO: 24.7 % (ref 19.6–45.3)
MAGNESIUM SERPL-MCNC: 1.8 MG/DL (ref 1.6–2.6)
MCH RBC QN AUTO: 28.1 PG (ref 26.6–33)
MCHC RBC AUTO-ENTMCNC: 34 G/DL (ref 31.5–35.7)
MCV RBC AUTO: 82.6 FL (ref 79–97)
MONOCYTES # BLD AUTO: 0.44 10*3/MM3 (ref 0.1–0.9)
MONOCYTES NFR BLD AUTO: 5.1 % (ref 5–12)
NEUTROPHILS # BLD AUTO: 5.81 10*3/MM3 (ref 1.7–7)
NEUTROPHILS NFR BLD AUTO: 66.8 % (ref 42.7–76)
NRBC BLD AUTO-RTO: 0 /100 WBC (ref 0–0.2)
PLATELET # BLD AUTO: 201 10*3/MM3 (ref 140–450)
PMV BLD AUTO: 10.2 FL (ref 6–12)
POTASSIUM BLD-SCNC: 2.7 MMOL/L (ref 3.5–5.2)
PROT SERPL-MCNC: 6.8 G/DL (ref 6–8.5)
RBC # BLD AUTO: 5.59 10*6/MM3 (ref 4.14–5.8)
SODIUM BLD-SCNC: 143 MMOL/L (ref 136–145)
WBC NRBC COR # BLD: 8.69 10*3/MM3 (ref 3.4–10.8)
WHOLE BLOOD HOLD SPECIMEN: NORMAL

## 2020-04-06 PROCEDURE — 99284 EMERGENCY DEPT VISIT MOD MDM: CPT

## 2020-04-06 PROCEDURE — 85025 COMPLETE CBC W/AUTO DIFF WBC: CPT | Performed by: FAMILY MEDICINE

## 2020-04-06 PROCEDURE — 83735 ASSAY OF MAGNESIUM: CPT | Performed by: FAMILY MEDICINE

## 2020-04-06 PROCEDURE — 80053 COMPREHEN METABOLIC PANEL: CPT | Performed by: FAMILY MEDICINE

## 2020-04-06 PROCEDURE — 82550 ASSAY OF CK (CPK): CPT | Performed by: FAMILY MEDICINE

## 2020-04-06 PROCEDURE — 25010000003 POTASSIUM CHLORIDE 10 MEQ/100ML SOLUTION: Performed by: FAMILY MEDICINE

## 2020-04-06 PROCEDURE — 96365 THER/PROPH/DIAG IV INF INIT: CPT

## 2020-04-06 PROCEDURE — 80307 DRUG TEST PRSMV CHEM ANLYZR: CPT | Performed by: FAMILY MEDICINE

## 2020-04-06 RX ORDER — POTASSIUM CHLORIDE 750 MG/1
10 TABLET, FILM COATED, EXTENDED RELEASE ORAL 2 TIMES DAILY
Qty: 20 TABLET | Refills: 0 | Status: SHIPPED | OUTPATIENT
Start: 2020-04-06 | End: 2020-04-13 | Stop reason: SDUPTHER

## 2020-04-06 RX ORDER — POTASSIUM CHLORIDE 7.45 MG/ML
10 INJECTION INTRAVENOUS ONCE
Status: COMPLETED | OUTPATIENT
Start: 2020-04-06 | End: 2020-04-06

## 2020-04-06 RX ORDER — BENZTROPINE MESYLATE 1 MG/1
1 TABLET ORAL ONCE
Status: COMPLETED | OUTPATIENT
Start: 2020-04-06 | End: 2020-04-06

## 2020-04-06 RX ORDER — LORAZEPAM 2 MG/1
2 TABLET ORAL ONCE
Status: COMPLETED | OUTPATIENT
Start: 2020-04-06 | End: 2020-04-06

## 2020-04-06 RX ORDER — BENZTROPINE MESYLATE 1 MG/1
1 TABLET ORAL 2 TIMES DAILY PRN
Qty: 14 TABLET | Refills: 0 | OUTPATIENT
Start: 2020-04-06 | End: 2020-04-13

## 2020-04-06 RX ORDER — POTASSIUM CHLORIDE 750 MG/1
20 CAPSULE, EXTENDED RELEASE ORAL ONCE
Status: COMPLETED | OUTPATIENT
Start: 2020-04-06 | End: 2020-04-06

## 2020-04-06 RX ADMIN — POTASSIUM CHLORIDE 20 MEQ: 10 CAPSULE, COATED, EXTENDED RELEASE ORAL at 13:27

## 2020-04-06 RX ADMIN — BENZTROPINE MESYLATE 1 MG: 1 TABLET ORAL at 12:07

## 2020-04-06 RX ADMIN — POTASSIUM CHLORIDE 10 MEQ: 7.46 INJECTION, SOLUTION INTRAVENOUS at 13:27

## 2020-04-06 RX ADMIN — LORAZEPAM 2 MG: 2 TABLET ORAL at 12:07

## 2020-04-06 NOTE — ED PROVIDER NOTES
"Subjective   \"Shakes\" x 2 days.  Patient, who is not a good historian, states that he has been out of his \"medications\" for the past 2 days.  When questioned, the medication that he has been out of is his Cogentin.  He states that he does have intermittent episodes of \"shakes,\" for an extended period of time.       Other   Location:  Right and left upper extremities  Severity:  Moderate  Onset quality:  Unable to specify  Duration:  2 days  Timing:  Intermittent  Progression:  Waxing and waning  Chronicity:  Chronic  Relieved by:  Home meds  Worsened by:  Nothing  Associated symptoms: no abdominal pain, no chest pain, no congestion, no cough, no diarrhea, no ear pain, no fatigue, no fever, no headaches, no myalgias, no nausea, no rash, no rhinorrhea, no shortness of breath, no sore throat, no vomiting and no wheezing        Review of Systems   Constitutional: Negative for appetite change, chills, diaphoresis, fatigue and fever.   HENT: Negative for congestion, ear discharge, ear pain, nosebleeds, rhinorrhea, sinus pressure, sore throat and trouble swallowing.    Eyes: Negative for discharge and redness.   Respiratory: Negative for apnea, cough, chest tightness, shortness of breath and wheezing.    Cardiovascular: Negative for chest pain.   Gastrointestinal: Negative for abdominal pain, diarrhea, nausea and vomiting.   Endocrine: Negative for polyuria.   Genitourinary: Negative for dysuria, frequency and urgency.   Musculoskeletal: Negative for myalgias and neck pain.   Skin: Negative for color change and rash.   Allergic/Immunologic: Negative for immunocompromised state.   Neurological: Negative for dizziness, seizures, syncope, weakness, light-headedness and headaches.   Hematological: Negative for adenopathy. Does not bruise/bleed easily.   Psychiatric/Behavioral: Negative for behavioral problems and confusion.   All other systems reviewed and are negative.      Past Medical History:   Diagnosis Date   • " Depression    • Hyperlipidemia    • Hypertension    • Seizures (CMS/HCC)    • Tremors of nervous system        Allergies   Allergen Reactions   • Gabapentin    • Hydrocodone    • Simvastatin        Past Surgical History:   Procedure Laterality Date   • BACK SURGERY     • BRAIN SURGERY         No family history on file.    Social History     Socioeconomic History   • Marital status: Single     Spouse name: Not on file   • Number of children: Not on file   • Years of education: Not on file   • Highest education level: Not on file   Tobacco Use   • Smoking status: Current Every Day Smoker     Packs/day: 0.50     Types: Cigarettes   • Tobacco comment: 3-4 ciggs per day   Substance and Sexual Activity   • Alcohol use: No   • Drug use: No   • Sexual activity: Defer   Social History Narrative    Pt lives by himself, unemployed           Objective   Physical Exam   Constitutional: He is oriented to person, place, and time. He appears well-developed and well-nourished.   HENT:   Head: Normocephalic and atraumatic.   Nose: Nose normal.   Mouth/Throat: Oropharynx is clear and moist.   Eyes: Pupils are equal, round, and reactive to light. Conjunctivae and EOM are normal. Right eye exhibits no discharge. Left eye exhibits no discharge. No scleral icterus.   Neck: Normal range of motion. Neck supple. No tracheal deviation present.   Cardiovascular: Normal rate, regular rhythm and normal heart sounds.   No murmur heard.  Pulmonary/Chest: Effort normal and breath sounds normal. No stridor. No respiratory distress. He has no wheezes. He has no rales.   Abdominal: Soft. Bowel sounds are normal. He exhibits no distension and no mass. There is no tenderness. There is no rebound and no guarding.   Musculoskeletal: He exhibits no edema.   Neurological: He is alert and oriented to person, place, and time. He displays tremor (right and left hand, (R>L)). Coordination normal.   Skin: Skin is warm and dry. No rash noted. No erythema.    Psychiatric: He has a normal mood and affect. His behavior is normal. Thought content normal.   Nursing note and vitals reviewed.      Procedures           ED Course                          Labs Reviewed   COMPREHENSIVE METABOLIC PANEL - Abnormal; Notable for the following components:       Result Value    Glucose 124 (*)     BUN <2 (*)     Potassium 2.7 (*)     Chloride 95 (*)     CO2 35.0 (*)     Alkaline Phosphatase 133 (*)     All other components within normal limits    Narrative:     GFR Normal >60  Chronic Kidney Disease <60  Kidney Failure <15     MAGNESIUM - Normal   CK - Normal   CBC WITH AUTO DIFFERENTIAL - Normal   ETHANOL   CBC AND DIFFERENTIAL    Narrative:     The following orders were created for panel order CBC & Differential.  Procedure                               Abnormality         Status                     ---------                               -----------         ------                     CBC Auto Differential[[id number]]        Normal              Final result                 Please view results for these tests on the individual orders.   GOLD TOP - SST   EXTRA TUBES    Narrative:     The following orders were created for panel order Extra Tubes.  Procedure                               Abnormality         Status                     ---------                               -----------         ------                     Light Blue Top[[id number]]                                                              Gold Top - SST[[id number]]                                   Final result                 Please view results for these tests on the individual orders.   LIGHT BLUE TOP       No orders to display                             MDM    Final diagnoses:   Tremor of both hands   Hypokalemia            Dionicio Wayne MD  04/06/20 3375

## 2020-04-13 ENCOUNTER — HOSPITAL ENCOUNTER (EMERGENCY)
Facility: HOSPITAL | Age: 55
Discharge: HOME | End: 2020-04-13
Attending: EMERGENCY MEDICINE | Admitting: EMERGENCY MEDICINE

## 2020-04-13 VITALS
BODY MASS INDEX: 25.01 KG/M2 | SYSTOLIC BLOOD PRESSURE: 124 MMHG | HEART RATE: 96 BPM | WEIGHT: 165 LBS | TEMPERATURE: 98.7 F | HEIGHT: 68 IN | DIASTOLIC BLOOD PRESSURE: 90 MMHG | OXYGEN SATURATION: 96 % | RESPIRATION RATE: 18 BRPM

## 2020-04-13 DIAGNOSIS — G25.2 COARSE TREMORS: Primary | ICD-10-CM

## 2020-04-13 DIAGNOSIS — E87.6 HYPOKALEMIA: ICD-10-CM

## 2020-04-13 LAB
ALBUMIN SERPL-MCNC: 3.8 G/DL (ref 3.5–5.2)
ALBUMIN/GLOB SERPL: 1.3 G/DL
ALP SERPL-CCNC: 130 U/L (ref 39–117)
ALT SERPL W P-5'-P-CCNC: 5 U/L (ref 1–41)
ANION GAP SERPL CALCULATED.3IONS-SCNC: 13 MMOL/L (ref 5–15)
AST SERPL-CCNC: 14 U/L (ref 1–40)
BASOPHILS # BLD AUTO: 0.1 10*3/MM3 (ref 0–0.2)
BASOPHILS NFR BLD AUTO: 1.2 % (ref 0–1.5)
BILIRUB SERPL-MCNC: 0.7 MG/DL (ref 0.2–1.2)
BILIRUB UR QL STRIP: NEGATIVE
BUN BLD-MCNC: 2 MG/DL (ref 6–20)
BUN/CREAT SERPL: 2.5 (ref 7–25)
CALCIUM SPEC-SCNC: 8.9 MG/DL (ref 8.6–10.5)
CHLORIDE SERPL-SCNC: 99 MMOL/L (ref 98–107)
CLARITY UR: CLEAR
CO2 SERPL-SCNC: 29 MMOL/L (ref 22–29)
COLOR UR: YELLOW
CREAT BLD-MCNC: 0.8 MG/DL (ref 0.76–1.27)
DEPRECATED RDW RBC AUTO: 40.5 FL (ref 37–54)
EOSINOPHIL # BLD AUTO: 0.18 10*3/MM3 (ref 0–0.4)
EOSINOPHIL NFR BLD AUTO: 2.1 % (ref 0.3–6.2)
ERYTHROCYTE [DISTWIDTH] IN BLOOD BY AUTOMATED COUNT: 13.9 % (ref 12.3–15.4)
GFR SERPL CREATININE-BSD FRML MDRD: 101 ML/MIN/1.73
GLOBULIN UR ELPH-MCNC: 2.9 GM/DL
GLUCOSE BLD-MCNC: 116 MG/DL (ref 65–99)
GLUCOSE UR STRIP-MCNC: NEGATIVE MG/DL
HCT VFR BLD AUTO: 45.8 % (ref 37.5–51)
HGB BLD-MCNC: 15.5 G/DL (ref 13–17.7)
HGB UR QL STRIP.AUTO: NEGATIVE
HOLD SPECIMEN: NORMAL
IMM GRANULOCYTES # BLD AUTO: 0.01 10*3/MM3 (ref 0–0.05)
IMM GRANULOCYTES NFR BLD AUTO: 0.1 % (ref 0–0.5)
KETONES UR QL STRIP: NEGATIVE
LEUKOCYTE ESTERASE UR QL STRIP.AUTO: NEGATIVE
LYMPHOCYTES # BLD AUTO: 2.03 10*3/MM3 (ref 0.7–3.1)
LYMPHOCYTES NFR BLD AUTO: 23.9 % (ref 19.6–45.3)
MAGNESIUM SERPL-MCNC: 2.1 MG/DL (ref 1.6–2.6)
MCH RBC QN AUTO: 27.9 PG (ref 26.6–33)
MCHC RBC AUTO-ENTMCNC: 33.8 G/DL (ref 31.5–35.7)
MCV RBC AUTO: 82.4 FL (ref 79–97)
MONOCYTES # BLD AUTO: 0.49 10*3/MM3 (ref 0.1–0.9)
MONOCYTES NFR BLD AUTO: 5.8 % (ref 5–12)
NEUTROPHILS # BLD AUTO: 5.7 10*3/MM3 (ref 1.7–7)
NEUTROPHILS NFR BLD AUTO: 66.9 % (ref 42.7–76)
NITRITE UR QL STRIP: NEGATIVE
NRBC BLD AUTO-RTO: 0 /100 WBC (ref 0–0.2)
PH UR STRIP.AUTO: 7 [PH] (ref 5–9)
PLATELET # BLD AUTO: 221 10*3/MM3 (ref 140–450)
PMV BLD AUTO: 10.3 FL (ref 6–12)
POTASSIUM BLD-SCNC: 2.9 MMOL/L (ref 3.5–5.2)
PROT SERPL-MCNC: 6.7 G/DL (ref 6–8.5)
PROT UR QL STRIP: NEGATIVE
RBC # BLD AUTO: 5.56 10*6/MM3 (ref 4.14–5.8)
SODIUM BLD-SCNC: 141 MMOL/L (ref 136–145)
SP GR UR STRIP: 1.01 (ref 1–1.03)
UROBILINOGEN UR QL STRIP: NORMAL
WBC NRBC COR # BLD: 8.51 10*3/MM3 (ref 3.4–10.8)
WHOLE BLOOD HOLD SPECIMEN: NORMAL

## 2020-04-13 PROCEDURE — 99283 EMERGENCY DEPT VISIT LOW MDM: CPT

## 2020-04-13 PROCEDURE — 81003 URINALYSIS AUTO W/O SCOPE: CPT | Performed by: EMERGENCY MEDICINE

## 2020-04-13 PROCEDURE — 80053 COMPREHEN METABOLIC PANEL: CPT | Performed by: EMERGENCY MEDICINE

## 2020-04-13 PROCEDURE — 85025 COMPLETE CBC W/AUTO DIFF WBC: CPT | Performed by: EMERGENCY MEDICINE

## 2020-04-13 PROCEDURE — 83735 ASSAY OF MAGNESIUM: CPT | Performed by: EMERGENCY MEDICINE

## 2020-04-13 RX ORDER — SODIUM CHLORIDE 0.9 % (FLUSH) 0.9 %
10 SYRINGE (ML) INJECTION AS NEEDED
Status: DISCONTINUED | OUTPATIENT
Start: 2020-04-13 | End: 2020-04-13 | Stop reason: HOSPADM

## 2020-04-13 RX ORDER — POTASSIUM CHLORIDE 1500 MG/1
20 TABLET, FILM COATED, EXTENDED RELEASE ORAL DAILY
Qty: 30 TABLET | Refills: 0 | Status: SHIPPED | OUTPATIENT
Start: 2020-04-13

## 2020-04-13 RX ORDER — BACLOFEN 10 MG/1
10 TABLET ORAL 3 TIMES DAILY
Qty: 30 TABLET | Refills: 0 | OUTPATIENT
Start: 2020-04-13 | End: 2020-04-17

## 2020-04-13 NOTE — ED NOTES
Pt presents to ED with C/O hand tremors for several years. Pt reports he has them daily. Nothing worsens or improves tremors. Pt reports he was seen last week in this ED for tremors.     Yenny Victor RN  04/13/20 2713

## 2020-04-13 NOTE — ED PROVIDER NOTES
Subjective   Patient presents emergency department complaint of tremors.  Patient has been seen in the last week for the same.  At that time patient was not taking his Cogentin medication which was we have written for a week, coincidentally 7 days ago.  Patient denies any fevers or chills, no nausea vomiting, no chest pain or shortness of breath.  Patient states he is [facility].  Patient does not know his medications and is been unable to give me a medication list at this time.  Patient notes that this is been going on subacutely for months.          Review of Systems   Constitutional: Negative.  Negative for appetite change, chills and fever.   HENT: Negative.  Negative for congestion.    Eyes: Negative.  Negative for photophobia and visual disturbance.   Respiratory: Negative.  Negative for cough, chest tightness and shortness of breath.    Cardiovascular: Negative.  Negative for chest pain and palpitations.   Gastrointestinal: Negative.  Negative for abdominal pain, constipation, diarrhea, nausea and vomiting.   Endocrine: Negative.    Genitourinary: Negative.  Negative for decreased urine volume, dysuria, flank pain and hematuria.   Musculoskeletal: Negative.  Negative for arthralgias, back pain, myalgias, neck pain and neck stiffness.   Skin: Negative.  Negative for pallor.   Neurological: Positive for tremors. Negative for dizziness, syncope, weakness, light-headedness, numbness and headaches.   Psychiatric/Behavioral: Negative.  Negative for confusion and suicidal ideas. The patient is not nervous/anxious.    All other systems reviewed and are negative.      Past Medical History:   Diagnosis Date   • Depression    • Hyperlipidemia    • Hypertension    • Seizures (CMS/HCC)    • Tremors of nervous system        Allergies   Allergen Reactions   • Gabapentin    • Hydrocodone    • Simvastatin    • Trazodone Anxiety       Past Surgical History:    Procedure Laterality Date   • BACK SURGERY     • BRAIN SURGERY         History reviewed. No pertinent family history.    Social History     Socioeconomic History   • Marital status: Single     Spouse name: Not on file   • Number of children: Not on file   • Years of education: Not on file   • Highest education level: Not on file   Tobacco Use   • Smoking status: Current Every Day Smoker     Packs/day: 0.50     Types: Cigarettes   • Smokeless tobacco: Never Used   Substance and Sexual Activity   • Alcohol use: No   • Drug use: No   • Sexual activity: Defer   Social History Narrative    Pt lives by himself, unemployed           Objective   Physical Exam   Constitutional: He is oriented to person, place, and time. He appears well-developed and well-nourished. No distress.   HENT:   Head: Normocephalic and atraumatic.   Eyes: Conjunctivae and EOM are normal.   Neck: Normal range of motion. Neck supple. No JVD present.   Cardiovascular: Normal rate, regular rhythm, normal heart sounds and intact distal pulses. Exam reveals no gallop and no friction rub.   No murmur heard.  Pulmonary/Chest: Effort normal. No respiratory distress. He has no wheezes. He has no rales. He exhibits no tenderness.   Abdominal: Soft. Bowel sounds are normal. He exhibits no distension and no mass. There is no tenderness. There is no rebound and no guarding.   Musculoskeletal: Normal range of motion. He exhibits no tenderness.   Neurological: He is alert and oriented to person, place, and time.   Patient with a distractible tremor of the right greater than left upper extremities.  There are times when the patient is talking when he is not shaking at all.   Skin: Skin is warm and dry. Capillary refill takes less than 2 seconds. He is not diaphoretic.   Psychiatric: He has a normal mood and affect. His behavior is normal. Judgment and thought content normal.   Nursing note and vitals reviewed.      Procedures           ED Course                                  Labs Reviewed   COMPREHENSIVE METABOLIC PANEL - Abnormal; Notable for the following components:       Result Value    Glucose 116 (*)     BUN 2 (*)     Potassium 2.9 (*)     Alkaline Phosphatase 130 (*)     BUN/Creatinine Ratio 2.5 (*)     All other components within normal limits    Narrative:     GFR Normal >60  Chronic Kidney Disease <60  Kidney Failure <15     URINALYSIS W/ MICROSCOPIC IF INDICATED (NO CULTURE) - Normal    Narrative:     Urine microscopic not indicated.   MAGNESIUM - Normal   CBC WITH AUTO DIFFERENTIAL - Normal   CBC AND DIFFERENTIAL    Narrative:     The following orders were created for panel order CBC & Differential.  Procedure                               Abnormality         Status                     ---------                               -----------         ------                     CBC Auto Differential[[id number]]        Normal              Final result                 Please view results for these tests on the individual orders.   EXTRA TUBES    Narrative:     The following orders were created for panel order Extra Tubes.  Procedure                               Abnormality         Status                     ---------                               -----------         ------                     Light Blue Top[[id number]]                                   In process                 Gold Top - SST[[id number]]                                   In process                   Please view results for these tests on the individual orders.   LIGHT BLUE TOP   GOLD TOP - SST       No orders to display     Patient with distractible coarse tremor.  Patient is unsure of his care, has had these symptoms for over 2 years.  States it woke him up this morning is what brought him to the ED.  The hypokalemia is improving on supplemental potassium.  Patient will continue with this.  Patient given short course of baclofen for symptomatic relief.          MDM    Final diagnoses:   Coarse tremors    Hypokalemia            Danny Story MD  04/13/20 2255

## 2020-04-13 NOTE — DISCHARGE INSTRUCTIONS
Baclofen as needed for further management of your tremors.  [facility] for further management and referral for your chronic tremors.

## 2020-04-17 ENCOUNTER — HOSPITAL ENCOUNTER (EMERGENCY)
Facility: HOSPITAL | Age: 55
Discharge: HOME | End: 2020-04-17
Attending: EMERGENCY MEDICINE | Admitting: EMERGENCY MEDICINE

## 2020-04-17 ENCOUNTER — APPOINTMENT (OUTPATIENT)
Dept: CT IMAGING | Facility: HOSPITAL | Age: 55
End: 2020-04-17

## 2020-04-17 VITALS
SYSTOLIC BLOOD PRESSURE: 167 MMHG | HEIGHT: 68 IN | DIASTOLIC BLOOD PRESSURE: 103 MMHG | WEIGHT: 165 LBS | HEART RATE: 89 BPM | BODY MASS INDEX: 25.01 KG/M2 | RESPIRATION RATE: 18 BRPM | TEMPERATURE: 98.6 F | OXYGEN SATURATION: 98 %

## 2020-04-17 DIAGNOSIS — I10 UNCONTROLLED HYPERTENSION: ICD-10-CM

## 2020-04-17 DIAGNOSIS — R25.1 TREMOR OF UNKNOWN ORIGIN: Primary | ICD-10-CM

## 2020-04-17 LAB
AMPHET+METHAMPHET UR QL: NEGATIVE
AMPHETAMINES UR QL: NEGATIVE
ANION GAP SERPL CALCULATED.3IONS-SCNC: 11 MMOL/L (ref 5–15)
BARBITURATES UR QL SCN: NEGATIVE
BASOPHILS # BLD AUTO: 0.09 10*3/MM3 (ref 0–0.2)
BASOPHILS NFR BLD AUTO: 1.1 % (ref 0–1.5)
BENZODIAZ UR QL SCN: NEGATIVE
BUN BLD-MCNC: 3 MG/DL (ref 6–20)
BUN/CREAT SERPL: 4.2 (ref 7–25)
BUPRENORPHINE SERPL-MCNC: NEGATIVE NG/ML
CALCIUM SPEC-SCNC: 9 MG/DL (ref 8.6–10.5)
CANNABINOIDS SERPL QL: NEGATIVE
CHLORIDE SERPL-SCNC: 105 MMOL/L (ref 98–107)
CO2 SERPL-SCNC: 28 MMOL/L (ref 22–29)
COCAINE UR QL: NEGATIVE
CREAT BLD-MCNC: 0.72 MG/DL (ref 0.76–1.27)
DEPRECATED RDW RBC AUTO: 43 FL (ref 37–54)
EOSINOPHIL # BLD AUTO: 0.26 10*3/MM3 (ref 0–0.4)
EOSINOPHIL NFR BLD AUTO: 3.2 % (ref 0.3–6.2)
ERYTHROCYTE [DISTWIDTH] IN BLOOD BY AUTOMATED COUNT: 14.5 % (ref 12.3–15.4)
GFR SERPL CREATININE-BSD FRML MDRD: 114 ML/MIN/1.73
GLUCOSE BLD-MCNC: 109 MG/DL (ref 65–99)
HCT VFR BLD AUTO: 40.3 % (ref 37.5–51)
HGB BLD-MCNC: 13.4 G/DL (ref 13–17.7)
HOLD SPECIMEN: NORMAL
IMM GRANULOCYTES # BLD AUTO: 0.01 10*3/MM3 (ref 0–0.05)
IMM GRANULOCYTES NFR BLD AUTO: 0.1 % (ref 0–0.5)
LYMPHOCYTES # BLD AUTO: 1.7 10*3/MM3 (ref 0.7–3.1)
LYMPHOCYTES NFR BLD AUTO: 20.9 % (ref 19.6–45.3)
MCH RBC QN AUTO: 27.7 PG (ref 26.6–33)
MCHC RBC AUTO-ENTMCNC: 33.3 G/DL (ref 31.5–35.7)
MCV RBC AUTO: 83.4 FL (ref 79–97)
METHADONE UR QL SCN: NEGATIVE
MONOCYTES # BLD AUTO: 0.55 10*3/MM3 (ref 0.1–0.9)
MONOCYTES NFR BLD AUTO: 6.8 % (ref 5–12)
NEUTROPHILS # BLD AUTO: 5.51 10*3/MM3 (ref 1.7–7)
NEUTROPHILS NFR BLD AUTO: 67.9 % (ref 42.7–76)
NRBC BLD AUTO-RTO: 0 /100 WBC (ref 0–0.2)
OPIATES UR QL: NEGATIVE
OXYCODONE UR QL SCN: NEGATIVE
PCP UR QL SCN: NEGATIVE
PLATELET # BLD AUTO: 212 10*3/MM3 (ref 140–450)
PMV BLD AUTO: 10.6 FL (ref 6–12)
POTASSIUM BLD-SCNC: 3.3 MMOL/L (ref 3.5–5.2)
PROPOXYPH UR QL: NEGATIVE
RBC # BLD AUTO: 4.83 10*6/MM3 (ref 4.14–5.8)
SODIUM BLD-SCNC: 144 MMOL/L (ref 136–145)
TRICYCLICS UR QL SCN: NEGATIVE
WBC NRBC COR # BLD: 8.12 10*3/MM3 (ref 3.4–10.8)
WHOLE BLOOD HOLD SPECIMEN: NORMAL

## 2020-04-17 PROCEDURE — 99283 EMERGENCY DEPT VISIT LOW MDM: CPT

## 2020-04-17 PROCEDURE — 80048 BASIC METABOLIC PNL TOTAL CA: CPT | Performed by: EMERGENCY MEDICINE

## 2020-04-17 PROCEDURE — 80306 DRUG TEST PRSMV INSTRMNT: CPT | Performed by: EMERGENCY MEDICINE

## 2020-04-17 PROCEDURE — 85025 COMPLETE CBC W/AUTO DIFF WBC: CPT | Performed by: EMERGENCY MEDICINE

## 2020-04-17 PROCEDURE — 70450 CT HEAD/BRAIN W/O DYE: CPT

## 2020-04-17 RX ORDER — SODIUM CHLORIDE 0.9 % (FLUSH) 0.9 %
10 SYRINGE (ML) INJECTION AS NEEDED
Status: DISCONTINUED | OUTPATIENT
Start: 2020-04-17 | End: 2020-04-17 | Stop reason: HOSPADM

## 2020-04-17 RX ORDER — LISINOPRIL 20 MG/1
20 TABLET ORAL DAILY
Qty: 7 TABLET | Refills: 0 | Status: SHIPPED | OUTPATIENT
Start: 2020-04-17 | End: 2020-04-17

## 2020-04-17 RX ORDER — HYDROXYZINE HYDROCHLORIDE 25 MG/1
25 TABLET, FILM COATED ORAL EVERY 6 HOURS PRN
Qty: 20 TABLET | Refills: 0 | Status: SHIPPED | OUTPATIENT
Start: 2020-04-17

## 2020-04-17 RX ORDER — POTASSIUM CHLORIDE 750 MG/1
20 CAPSULE, EXTENDED RELEASE ORAL ONCE
Status: DISCONTINUED | OUTPATIENT
Start: 2020-04-17 | End: 2020-04-17 | Stop reason: HOSPADM

## 2020-04-17 NOTE — ED PROVIDER NOTES
Subjective   55yo male pmh significant htn/seizure/mdd/chronic tremor presents ED c/o increased BUE tremor over the past week.  Pt has been seen [facility] ER x2 prior visits in past 2 weeks for same.  Pt reports has had intermittent tremors x2 years.  ROS neg headache/diplopia/paresthesia/motor weakness/seizure/chest pain/soa/fever/abd pain.  Pt reports last seizure activity 6 months ago.      History provided by:  Patient  Neurologic Problem   The patient's pertinent negatives include no weakness. This is a chronic problem. Pertinent negatives include no dizziness or headaches.       Review of Systems   Constitutional: Negative.    HENT: Negative.    Respiratory: Negative.    Cardiovascular: Negative.    Gastrointestinal: Negative.    Genitourinary: Negative.    Musculoskeletal: Negative.    Skin: Negative.    Neurological: Positive for tremors. Negative for dizziness, seizures, facial asymmetry, speech difficulty, weakness, numbness and headaches.   All other systems reviewed and are negative.      Past Medical History:   Diagnosis Date   • Depression    • Hyperlipidemia    • Hypertension    • Seizures (CMS/HCC)    • Tremors of nervous system        Allergies   Allergen Reactions   • Gabapentin    • Hydrocodone    • Simvastatin    • Trazodone Anxiety       Past Surgical History:   Procedure Laterality Date   • BACK SURGERY     • BRAIN SURGERY         History reviewed. No pertinent family history.    Social History     Socioeconomic History   • Marital status: Single     Spouse name: Not on file   • Number of children: Not on file   • Years of education: Not on file   • Highest education level: Not on file   Tobacco Use   • Smoking status: Current Every Day Smoker     Packs/day: 1.00     Types: Cigarettes   • Smokeless tobacco: Never Used   Substance and Sexual Activity   • Alcohol use: No   • Drug use: No   • Sexual activity: Defer   Social History Narrative    Pt lives by himself, unemployed           Objective    Physical Exam   Constitutional: He is oriented to person, place, and time. He appears well-developed and well-nourished.   HENT:   Head: Normocephalic and atraumatic.   Right Ear: External ear normal.   Left Ear: External ear normal.   Nose: Nose normal.   Mouth/Throat: Oropharynx is clear and moist.   Eyes: Pupils are equal, round, and reactive to light. EOM are normal.   Neck: Normal range of motion. Neck supple. No JVD present. No tracheal deviation present.   Cardiovascular: Normal rate, regular rhythm, normal heart sounds and intact distal pulses. Exam reveals no gallop and no friction rub.   No murmur heard.  Pulmonary/Chest: Effort normal and breath sounds normal. He has no wheezes. He has no rales.   Abdominal: Soft. Bowel sounds are normal. He exhibits no distension and no mass. There is no tenderness. There is no rebound and no guarding.   Musculoskeletal: Normal range of motion.   Lymphadenopathy:     He has no cervical adenopathy.   Neurological: He is alert and oriented to person, place, and time. He has normal strength. He displays tremor. No cranial nerve deficit or sensory deficit. He exhibits normal muscle tone. He displays no seizure activity. Coordination normal. GCS eye subscore is 4. GCS verbal subscore is 5. GCS motor subscore is 6.   Reflex Scores:       Bicep reflexes are 2+ on the right side and 2+ on the left side.  Coarse BUE resting tremor, resolves with intentional movement.  Ftn/hts/alice intact   Skin: Skin is warm and dry.   Nursing note and vitals reviewed.      Procedures           ED Course            Labs Reviewed   BASIC METABOLIC PANEL - Abnormal; Notable for the following components:       Result Value    Glucose 109 (*)     BUN 3 (*)     Creatinine 0.72 (*)     Potassium 3.3 (*)     BUN/Creatinine Ratio 4.2 (*)     All other components within normal limits    Narrative:     GFR Normal >60  Chronic Kidney Disease <60  Kidney Failure <15     URINE DRUG SCREEN - Normal     Narrative:     Cutoff For Drugs Screened:    Amphetamines               500 ng/ml  Barbiturates               200 ng/ml  Benzodiazepines            150 ng/ml  Cocaine                    150 ng/ml  Methadone                  200 ng/ml  Opiates                    100 ng/ml  Phencyclidine               25 ng/ml  THC                            50 ng/ml  Methamphetamine            500 ng/ml  Tricyclic Antidepressants  300 ng/ml  Oxycodone                  100 ng/ml  Propoxyphene               300 ng/ml  Buprenorphine               10 ng/ml    The normal value for all drugs tested is negative. This report includes unconfirmed screening results, with the cutoff values listed, to be used for medical treatment purposes only.  Unconfirmed results must not be used for non-medical purposes such as employment or legal testing.  Clinical consideration should be applied to any drug of abuse test, particularly when unconfirmed results are used.     CBC WITH AUTO DIFFERENTIAL - Normal   CBC AND DIFFERENTIAL    Narrative:     The following orders were created for panel order CBC & Differential.  Procedure                               Abnormality         Status                     ---------                               -----------         ------                     CBC Auto Differential[[id number]]        Normal              Final result                 Please view results for these tests on the individual orders.   EXTRA TUBES    Narrative:     The following orders were created for panel order Extra Tubes.  Procedure                               Abnormality         Status                     ---------                               -----------         ------                     Light Blue Top[[id number]]                                   Final result               Gold Top - [facility][[id number]]                                   Final result                 Please view results for these tests on the individual orders.   LIGHT BLUE TOP    [facility]     Ct Head Without Contrast    Result Date: 4/17/2020  Narrative: EXAMINATION:  CT SCAN OF THE HEAD WITHOUT INTRAVENOUS CONTRAST CLINICAL INFORMATION:  tremor This exam was performed using radiation doses that are as low as reasonably achievable (ALARA). This exam was performed according to our departmental dose optimization program, which includes automated exposure control, adjustment of the mA and/or KV according to patient size and/or use of iterative reconstruction technique. COMPARISON: None available. TECHNIQUE:  Axial images from skull base to vertex.  FINDINGS: There is no evidence of intracranial hemorrhage, parenchymal mass, midline shift, or focal mass effect. There is no hydrocephalus or effacement of the basilar cisterns. There is no extra-axial hemorrhage or collection identified. The mastoid air cells and visualized paranasal sinuses appear clear.       Impression: No evidence of intracranial hemorrhage, mass effect or large acute infarct. Electronically signed by:  Chaim Gayle MD  4/17/2020 8:00 AM CDT Workstation: [employer]                                      MDM  Number of Diagnoses or Management Options  Tremor of unknown origin: established and worsening  Uncontrolled hypertension: established and worsening  Diagnosis management comments: Labs notable for mild hypokalemia.  Pt given kcl 20meq po in ED.  CT head unremarkable. Neuro exam stable.  Pt reports did not take usual lisinopril 10mg today for hypertension.  Pt encouraged to resume bp meds as prescribed.  Stable discharge with pmd/neuro followup.       Amount and/or Complexity of Data Reviewed  Clinical lab tests: reviewed  Tests in the radiology section of CPT®: reviewed        Final diagnoses:   Tremor of unknown origin   Uncontrolled hypertension            Jacek Penaloza MD  04/17/20 0934       Jacek Penaloza MD  04/17/20 0939

## 2020-04-17 NOTE — ED NOTES
This tech in room to encourage patient to provide urine sample at this time. Patient provided with water.     Allison Khalil  04/17/20 0749

## 2020-04-17 NOTE — DISCHARGE INSTRUCTIONS
Return ED headache, seizure, motor weakness, sensory loss, worse condition, other concerns  Continue Lisinopril 10mg usual dose as previously prescribed

## 2020-04-17 NOTE — ED TRIAGE NOTES
"Patient states he has had an arm tremor for several days.  Patient was treated with Baclofen for \"tremors\" 4 days ago.  Patient states the medication given does not work.  Patient states the tremors are constant.  This nurse did not see any tremors while assessing the patient.  "

## 2020-04-17 NOTE — ED NOTES
"This tech in room to check patient vitals and encourage patient to provide urine sample, patient stated, \"im tired of trying.\" this tech informed patient that we would need a urine sample, patient stated \"well cath me then.\"      Allison Khalil  04/17/20 0916    "

## 2020-04-19 ENCOUNTER — HOSPITAL ENCOUNTER (INPATIENT)
Facility: HOSPITAL | Age: 55
LOS: 1 days | Discharge: ANOTHER HOSPITAL | End: 2020-04-20
Attending: EMERGENCY MEDICINE | Admitting: HOSPITALIST

## 2020-04-19 ENCOUNTER — APPOINTMENT (OUTPATIENT)
Dept: CT IMAGING | Facility: HOSPITAL | Age: 55
End: 2020-04-19

## 2020-04-19 ENCOUNTER — APPOINTMENT (OUTPATIENT)
Dept: GENERAL RADIOLOGY | Facility: HOSPITAL | Age: 55
End: 2020-04-19

## 2020-04-19 DIAGNOSIS — M62.82 NON-TRAUMATIC RHABDOMYOLYSIS: ICD-10-CM

## 2020-04-19 DIAGNOSIS — R41.82 ALTERED MENTAL STATUS, UNSPECIFIED ALTERED MENTAL STATUS TYPE: Primary | ICD-10-CM

## 2020-04-19 DIAGNOSIS — N17.9 ACUTE KIDNEY INJURY (NONTRAUMATIC) (HCC): ICD-10-CM

## 2020-04-19 PROBLEM — T44.3X1A ANTICHOLINERGIC SYNDROME: Status: ACTIVE | Noted: 2020-04-19

## 2020-04-19 LAB
ALBUMIN SERPL-MCNC: 3.7 G/DL (ref 3.5–5.2)
ALBUMIN/GLOB SERPL: 1.4 G/DL
ALP SERPL-CCNC: 116 U/L (ref 39–117)
ALT SERPL W P-5'-P-CCNC: 16 U/L (ref 1–41)
AMMONIA BLD-SCNC: 23 UMOL/L (ref 16–60)
AMPHET+METHAMPHET UR QL: NEGATIVE
AMPHETAMINES UR QL: NEGATIVE
ANION GAP SERPL CALCULATED.3IONS-SCNC: 22 MMOL/L (ref 5–15)
APAP SERPL-MCNC: <5 MCG/ML (ref 10–30)
ARTERIAL PATENCY WRIST A: ABNORMAL
AST SERPL-CCNC: 51 U/L (ref 1–40)
ATMOSPHERIC PRESS: 741 MMHG
BARBITURATES UR QL SCN: NEGATIVE
BASE EXCESS BLDA CALC-SCNC: 3.7 MMOL/L (ref 0–2)
BASOPHILS # BLD AUTO: 0.03 10*3/MM3 (ref 0–0.2)
BASOPHILS NFR BLD AUTO: 0.2 % (ref 0–1.5)
BDY SITE: ABNORMAL
BENZODIAZ UR QL SCN: NEGATIVE
BILIRUB SERPL-MCNC: 0.7 MG/DL (ref 0.2–1.2)
BILIRUB UR QL STRIP: NEGATIVE
BUN BLD-MCNC: 12 MG/DL (ref 6–20)
BUN/CREAT SERPL: 6.9 (ref 7–25)
BUPRENORPHINE SERPL-MCNC: NEGATIVE NG/ML
CALCIUM SPEC-SCNC: 9.4 MG/DL (ref 8.6–10.5)
CANNABINOIDS SERPL QL: NEGATIVE
CHLORIDE SERPL-SCNC: 99 MMOL/L (ref 98–107)
CK SERPL-CCNC: 5279 U/L (ref 20–200)
CLARITY UR: CLEAR
CO2 SERPL-SCNC: 25 MMOL/L (ref 22–29)
COCAINE UR QL: NEGATIVE
COLOR UR: YELLOW
CREAT BLD-MCNC: 1.73 MG/DL (ref 0.76–1.27)
DEPRECATED RDW RBC AUTO: 43.2 FL (ref 37–54)
EOSINOPHIL # BLD AUTO: 0 10*3/MM3 (ref 0–0.4)
EOSINOPHIL NFR BLD AUTO: 0 % (ref 0.3–6.2)
ERYTHROCYTE [DISTWIDTH] IN BLOOD BY AUTOMATED COUNT: 15 % (ref 12.3–15.4)
ETHANOL BLD-MCNC: <10 MG/DL (ref 0–10)
ETHANOL UR QL: <0.01 %
GAS FLOW AIRWAY: 5 LPM
GFR SERPL CREATININE-BSD FRML MDRD: 41 ML/MIN/1.73
GLOBULIN UR ELPH-MCNC: 2.7 GM/DL
GLUCOSE BLD-MCNC: 110 MG/DL (ref 65–99)
GLUCOSE BLDC GLUCOMTR-MCNC: 215 MG/DL (ref 70–130)
GLUCOSE UR STRIP-MCNC: NEGATIVE MG/DL
HCO3 BLDA-SCNC: 29.1 MMOL/L (ref 20–26)
HCT VFR BLD AUTO: 37.4 % (ref 37.5–51)
HGB BLD-MCNC: 12.7 G/DL (ref 13–17.7)
HGB UR QL STRIP.AUTO: NEGATIVE
HOLD SPECIMEN: NORMAL
IMM GRANULOCYTES # BLD AUTO: 0.08 10*3/MM3 (ref 0–0.05)
IMM GRANULOCYTES NFR BLD AUTO: 0.4 % (ref 0–0.5)
KETONES UR QL STRIP: NEGATIVE
LEUKOCYTE ESTERASE UR QL STRIP.AUTO: NEGATIVE
LYMPHOCYTES # BLD AUTO: 0.87 10*3/MM3 (ref 0.7–3.1)
LYMPHOCYTES NFR BLD AUTO: 4.6 % (ref 19.6–45.3)
Lab: ABNORMAL
MCH RBC QN AUTO: 27.9 PG (ref 26.6–33)
MCHC RBC AUTO-ENTMCNC: 34 G/DL (ref 31.5–35.7)
MCV RBC AUTO: 82.2 FL (ref 79–97)
METHADONE UR QL SCN: NEGATIVE
MODALITY: ABNORMAL
MONOCYTES # BLD AUTO: 1.04 10*3/MM3 (ref 0.1–0.9)
MONOCYTES NFR BLD AUTO: 5.5 % (ref 5–12)
NEUTROPHILS # BLD AUTO: 16.87 10*3/MM3 (ref 1.7–7)
NEUTROPHILS NFR BLD AUTO: 89.3 % (ref 42.7–76)
NITRITE UR QL STRIP: NEGATIVE
NRBC BLD AUTO-RTO: 0 /100 WBC (ref 0–0.2)
OPIATES UR QL: NEGATIVE
OXYCODONE UR QL SCN: NEGATIVE
PCO2 BLDA: 46.2 MM HG (ref 35–45)
PCP UR QL SCN: NEGATIVE
PH BLDA: 7.41 PH UNITS (ref 7.35–7.45)
PH UR STRIP.AUTO: 6 [PH] (ref 5–9)
PLATELET # BLD AUTO: 218 10*3/MM3 (ref 140–450)
PMV BLD AUTO: 11.3 FL (ref 6–12)
PO2 BLDA: 165 MM HG (ref 83–108)
POTASSIUM BLD-SCNC: 3.7 MMOL/L (ref 3.5–5.2)
PROPOXYPH UR QL: NEGATIVE
PROT SERPL-MCNC: 6.4 G/DL (ref 6–8.5)
PROT UR QL STRIP: NEGATIVE
RBC # BLD AUTO: 4.55 10*6/MM3 (ref 4.14–5.8)
SALICYLATES SERPL-MCNC: <0.3 MG/DL
SAO2 % BLDCOA: 99.4 % (ref 94–99)
SODIUM BLD-SCNC: 146 MMOL/L (ref 136–145)
SP GR UR STRIP: 1.01 (ref 1–1.03)
TRICYCLICS UR QL SCN: NEGATIVE
UROBILINOGEN UR QL STRIP: NORMAL
VENTILATOR MODE: ABNORMAL
WBC NRBC COR # BLD: 18.89 10*3/MM3 (ref 3.4–10.8)
WHOLE BLOOD HOLD SPECIMEN: NORMAL

## 2020-04-19 PROCEDURE — 82550 ASSAY OF CK (CPK): CPT | Performed by: EMERGENCY MEDICINE

## 2020-04-19 PROCEDURE — 80307 DRUG TEST PRSMV CHEM ANLYZR: CPT | Performed by: EMERGENCY MEDICINE

## 2020-04-19 PROCEDURE — 99285 EMERGENCY DEPT VISIT HI MDM: CPT

## 2020-04-19 PROCEDURE — 0 DIATRIZOATE MEGLUMINE & SODIUM PER 1 ML: Performed by: EMERGENCY MEDICINE

## 2020-04-19 PROCEDURE — 25010000002 IOPAMIDOL 61 % SOLUTION: Performed by: EMERGENCY MEDICINE

## 2020-04-19 PROCEDURE — 70450 CT HEAD/BRAIN W/O DYE: CPT

## 2020-04-19 PROCEDURE — 71045 X-RAY EXAM CHEST 1 VIEW: CPT

## 2020-04-19 PROCEDURE — 25010000002 LORAZEPAM PER 2 MG

## 2020-04-19 PROCEDURE — P9612 CATHETERIZE FOR URINE SPEC: HCPCS

## 2020-04-19 PROCEDURE — 80053 COMPREHEN METABOLIC PANEL: CPT | Performed by: EMERGENCY MEDICINE

## 2020-04-19 PROCEDURE — 36600 WITHDRAWAL OF ARTERIAL BLOOD: CPT

## 2020-04-19 PROCEDURE — 93005 ELECTROCARDIOGRAM TRACING: CPT | Performed by: EMERGENCY MEDICINE

## 2020-04-19 PROCEDURE — 85025 COMPLETE CBC W/AUTO DIFF WBC: CPT | Performed by: EMERGENCY MEDICINE

## 2020-04-19 PROCEDURE — 82140 ASSAY OF AMMONIA: CPT | Performed by: EMERGENCY MEDICINE

## 2020-04-19 PROCEDURE — 82962 GLUCOSE BLOOD TEST: CPT

## 2020-04-19 PROCEDURE — 82803 BLOOD GASES ANY COMBINATION: CPT

## 2020-04-19 PROCEDURE — 99233 SBSQ HOSP IP/OBS HIGH 50: CPT | Performed by: STUDENT IN AN ORGANIZED HEALTH CARE EDUCATION/TRAINING PROGRAM

## 2020-04-19 PROCEDURE — 81003 URINALYSIS AUTO W/O SCOPE: CPT | Performed by: EMERGENCY MEDICINE

## 2020-04-19 PROCEDURE — 93010 ELECTROCARDIOGRAM REPORT: CPT | Performed by: INTERNAL MEDICINE

## 2020-04-19 RX ORDER — LISINOPRIL 40 MG/1
40 TABLET ORAL DAILY
COMMUNITY

## 2020-04-19 RX ORDER — AMLODIPINE BESYLATE 10 MG/1
5 TABLET ORAL DAILY
COMMUNITY

## 2020-04-19 RX ORDER — SODIUM CHLORIDE 0.9 % (FLUSH) 0.9 %
10 SYRINGE (ML) INJECTION AS NEEDED
Status: DISCONTINUED | OUTPATIENT
Start: 2020-04-19 | End: 2020-04-20 | Stop reason: HOSPADM

## 2020-04-19 RX ORDER — FAMOTIDINE 20 MG/1
20 TABLET, FILM COATED ORAL NIGHTLY
COMMUNITY

## 2020-04-19 RX ORDER — PANTOPRAZOLE SODIUM 40 MG/1
40 TABLET, DELAYED RELEASE ORAL DAILY
COMMUNITY

## 2020-04-19 RX ORDER — TRAZODONE HYDROCHLORIDE 100 MG/1
100 TABLET ORAL NIGHTLY
COMMUNITY

## 2020-04-19 RX ORDER — LORAZEPAM 2 MG/ML
2 INJECTION INTRAMUSCULAR ONCE
Status: COMPLETED | OUTPATIENT
Start: 2020-04-19 | End: 2020-04-19

## 2020-04-19 RX ORDER — ATORVASTATIN CALCIUM 40 MG/1
20 TABLET, FILM COATED ORAL DAILY
COMMUNITY

## 2020-04-19 RX ORDER — ESCITALOPRAM OXALATE 20 MG/1
10 TABLET ORAL DAILY
COMMUNITY

## 2020-04-19 RX ORDER — LORAZEPAM 2 MG/ML
INJECTION INTRAMUSCULAR
Status: COMPLETED
Start: 2020-04-19 | End: 2020-04-19

## 2020-04-19 RX ORDER — BENZTROPINE MESYLATE 1 MG/1
1 TABLET ORAL 2 TIMES DAILY
COMMUNITY

## 2020-04-19 RX ORDER — SODIUM CHLORIDE 9 MG/ML
200 INJECTION, SOLUTION INTRAVENOUS CONTINUOUS
Status: DISCONTINUED | OUTPATIENT
Start: 2020-04-19 | End: 2020-04-20 | Stop reason: HOSPADM

## 2020-04-19 RX ORDER — FOLIC ACID 1 MG/1
1 TABLET ORAL DAILY
COMMUNITY

## 2020-04-19 RX ORDER — BACLOFEN 10 MG/1
20 TABLET ORAL 3 TIMES DAILY
COMMUNITY

## 2020-04-19 RX ORDER — MIRTAZAPINE 30 MG/1
15 TABLET, FILM COATED ORAL NIGHTLY
COMMUNITY

## 2020-04-19 RX ADMIN — SODIUM CHLORIDE 200 ML/HR: 900 INJECTION, SOLUTION INTRAVENOUS at 20:21

## 2020-04-19 RX ADMIN — SODIUM CHLORIDE 1000 ML: 900 INJECTION, SOLUTION INTRAVENOUS at 18:30

## 2020-04-19 RX ADMIN — LORAZEPAM 2 MG: 2 INJECTION INTRAMUSCULAR; INTRAVENOUS at 18:40

## 2020-04-19 RX ADMIN — SODIUM BICARBONATE 150 MEQ: 84 INJECTION, SOLUTION INTRAVENOUS at 22:02

## 2020-04-19 RX ADMIN — LORAZEPAM 2 MG: 2 INJECTION INTRAMUSCULAR at 18:40

## 2020-04-19 NOTE — ED PROVIDER NOTES
Subjective   Patient presents via EMS for altered mental status.  Patient was at the group home, and was noted to be crawling on the floor not answering questions.  Patient has had multiple ER visits for tremors over the past month.  There is been no known fevers.  No known ingestions.  No nausea vomiting.  Patient is on seizure medications though is not on any no narcotics.  Patient had received Narcan before arrival and did become more agitated since the Narcan.  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  Patient is is on mirtazapine Celexa, benztropine, 2 prescriptions for the same, trazodone, as well as valbenazine.           Review of Systems   Constitutional: Positive for activity change.   Psychiatric/Behavioral: Positive for agitation, behavioral problems, confusion and decreased concentration. The patient is nervous/anxious and is hyperactive.        Past Medical History:   Diagnosis Date   • Depression    • Hyperlipidemia    • Hypertension    • Seizures (CMS/HCC)    • Tremors of nervous system        Allergies   Allergen Reactions   • Gabapentin    • Hydrocodone    • Simvastatin    • Trazodone Anxiety       Past Surgical History:   Procedure Laterality Date   • BACK SURGERY     • BRAIN SURGERY         History reviewed. No pertinent family history.    Social History     Socioeconomic History   • Marital status: Single     Spouse name: Not on file   • Number of children: Not on file   • Years of education: Not on file   • Highest education level: Not on file   Tobacco Use   • Smoking status: Current Every Day Smoker     Packs/day: 2.00     Types: Cigarettes   • Smokeless tobacco: Never Used   Substance and Sexual Activity   • Alcohol use: No   • Drug use: No   • Sexual activity: Defer   Social History Narrative    Pt lives by himself, unemployed           Objective    Physical Exam   Constitutional: He appears well-developed and well-nourished. He appears ill. He appears distressed.   Anxious appearing male, akathisia, patient unwilling or unable to answer questions.    HENT:   Head: Normocephalic and atraumatic.   Eyes: Conjunctivae and EOM are normal.   Mydriatic pupils at 6 mm minimally reactive.   Neck: Normal range of motion. Neck supple. No JVD present.   Cardiovascular: Normal rate, regular rhythm, normal heart sounds and intact distal pulses. Exam reveals no gallop and no friction rub.   No murmur heard.  Pulmonary/Chest: Effort normal. No respiratory distress. He has no wheezes. He has no rales. He exhibits no tenderness.   Abdominal: Soft. Bowel sounds are normal. He exhibits no distension and no mass. There is no tenderness. There is no rebound and no guarding.   Musculoskeletal: Normal range of motion.   Neurological: He is disoriented. GCS eye subscore is 4. GCS verbal subscore is 4. GCS motor subscore is 5.   Patient moving all extremities, disoriented and uncooperative.  Patient will not answer questions coherently.   Skin: Skin is warm and dry. Capillary refill takes less than 2 seconds.   Psychiatric: He has a normal mood and affect. His behavior is normal. Judgment and thought content normal.   Nursing note and vitals reviewed.      Procedures           ED Course                                 Labs Reviewed   COMPREHENSIVE METABOLIC PANEL - Abnormal; Notable for the following components:       Result Value    Glucose 110 (*)     Creatinine 1.73 (*)     Sodium 146 (*)     AST (SGOT) 51 (*)     eGFR Non African Amer 41 (*)     BUN/Creatinine Ratio 6.9 (*)     Anion Gap 22.0 (*)     All other components within normal limits    Narrative:     GFR Normal >60  Chronic Kidney Disease <60  Kidney Failure <15     ACETAMINOPHEN LEVEL - Abnormal; Notable for the following components:    Acetaminophen <5.0 (*)     All other components within normal limits   CBC WITH AUTO  DIFFERENTIAL - Abnormal; Notable for the following components:    WBC 18.89 (*)     Hemoglobin 12.7 (*)     Hematocrit 37.4 (*)     Neutrophil % 89.3 (*)     Lymphocyte % 4.6 (*)     Eosinophil % 0.0 (*)     Neutrophils, Absolute 16.87 (*)     Monocytes, Absolute 1.04 (*)     Immature Grans, Absolute 0.08 (*)     All other components within normal limits   BLOOD GAS, ARTERIAL - Abnormal; Notable for the following components:    pCO2, Arterial 46.2 (*)     pO2, Arterial 165.0 (*)     HCO3, Arterial 29.1 (*)     Base Excess, Arterial 3.7 (*)     O2 Saturation, Arterial 99.4 (*)     All other components within normal limits   CK - Abnormal; Notable for the following components:    Creatine Kinase 5,279 (*)     All other components within normal limits   POCT GLUCOSE FINGERSTICK - Abnormal; Notable for the following components:    Glucose 215 (*)     All other components within normal limits   URINALYSIS W/ MICROSCOPIC IF INDICATED (NO CULTURE) - Normal    Narrative:     Urine microscopic not indicated.   URINE DRUG SCREEN - Normal    Narrative:     Cutoff For Drugs Screened:    Amphetamines               500 ng/ml  Barbiturates               200 ng/ml  Benzodiazepines            150 ng/ml  Cocaine                    150 ng/ml  Methadone                  200 ng/ml  Opiates                    100 ng/ml  Phencyclidine               25 ng/ml  THC                            50 ng/ml  Methamphetamine            500 ng/ml  Tricyclic Antidepressants  300 ng/ml  Oxycodone                  100 ng/ml  Propoxyphene               300 ng/ml  Buprenorphine               10 ng/ml    The normal value for all drugs tested is negative. This report includes unconfirmed screening results, with the cutoff values listed, to be used for medical treatment purposes only.  Unconfirmed results must not be used for non-medical purposes such as employment or legal testing.  Clinical consideration should be applied to any drug of abuse test,  particularly when unconfirmed results are used.     SALICYLATE LEVEL - Normal   AMMONIA - Normal   RAINBOW DRAW    Narrative:     The following orders were created for panel order [place] Draw.  Procedure                               Abnormality         Status                     ---------                               -----------         ------                     Light Blue Top[[id number]]                                   Final result               Green Top (Gel)[[id number]]                                  Final result               Lavender Top[[id number]]                                     Final result               Gold Top - SST[[id number]]                                   Final result                 Please view results for these tests on the individual orders.   ETHANOL   BLOOD GAS, ARTERIAL   POCT GLUCOSE FINGERSTICK   CBC AND DIFFERENTIAL    Narrative:     The following orders were created for panel order CBC & Differential.  Procedure                               Abnormality         Status                     ---------                               -----------         ------                     CBC Auto Differential[[id number]]        Abnormal            Final result                 Please view results for these tests on the individual orders.   LIGHT BLUE TOP   GREEN TOP   LAVENDER TOP   GOLD TOP - SST   EXTRA TUBES    Narrative:     The following orders were created for panel order Extra Tubes.  Procedure                               Abnormality         Status                     ---------                               -----------         ------                     Light Blue Top[[id number]]                                   Final result               Lavender Top[[id number]]                                     Final result               Gold Top - SST[[id number]]                                   Final result               Green Top (Gel)[[id number]]                                  Final result                  Please view results for these tests on the individual orders.   LIGHT BLUE TOP   LAVENDER TOP   GOLD TOP - SST   GREEN TOP       CT Head Without Contrast   Final Result   CONCLUSION:   No acute process.   Minimal cerebral atrophy.      [id number]      Electronically signed by:  Amauri Mcneill MD  4/19/2020 8:55 PM CDT   Workstation: [id number]      XR Chest 1 View   Final Result   CONCLUSION:   No Acute Disease      [id number]      Electronically signed by:  Amauri Mcneill MD  4/19/2020 6:34 PM CDT   Workstation: [id number]        Patient with signs symptoms of anticholinergic syndrome, patient on multiple medications for the same.  Patient does appear to be in rhabdomyolysis at this time.  Patient also with acute kidney injury.  Patient started on bicarb.  Patient sedated with Ativan on arrival secondary to his agitation.  Patient was for all intents and purposes confused and nonverbal to what was going on.  Patient will be admitted for further fluid management and medication titration.          [facility]    Final diagnoses:   Altered mental status, unspecified altered mental status type   Non-traumatic rhabdomyolysis   Acute kidney injury (nontraumatic) (CMS/Former[facility])            Danny Story MD  04/19/20 4320

## 2020-04-20 VITALS
DIASTOLIC BLOOD PRESSURE: 93 MMHG | RESPIRATION RATE: 20 BRPM | HEART RATE: 110 BPM | SYSTOLIC BLOOD PRESSURE: 156 MMHG | BODY MASS INDEX: 23.99 KG/M2 | TEMPERATURE: 98.2 F | OXYGEN SATURATION: 95 % | HEIGHT: 68 IN | WEIGHT: 158.3 LBS

## 2020-04-20 LAB
ALBUMIN SERPL-MCNC: 3.5 G/DL (ref 3.5–5.2)
ALBUMIN/GLOB SERPL: 1.3 G/DL
ALP SERPL-CCNC: 115 U/L (ref 39–117)
ALT SERPL W P-5'-P-CCNC: 29 U/L (ref 1–41)
ANION GAP SERPL CALCULATED.3IONS-SCNC: 13 MMOL/L (ref 5–15)
ARTERIAL PATENCY WRIST A: ABNORMAL
AST SERPL-CCNC: 181 U/L (ref 1–40)
ATMOSPHERIC PRESS: 743 MMHG
BASE EXCESS BLDA CALC-SCNC: 15.6 MMOL/L (ref 0–2)
BASOPHILS # BLD AUTO: 0.04 10*3/MM3 (ref 0–0.2)
BASOPHILS NFR BLD AUTO: 0.2 % (ref 0–1.5)
BDY SITE: ABNORMAL
BILIRUB SERPL-MCNC: 0.5 MG/DL (ref 0.2–1.2)
BUN BLD-MCNC: 8 MG/DL (ref 6–20)
BUN/CREAT SERPL: 9.6 (ref 7–25)
CALCIUM SPEC-SCNC: 8.5 MG/DL (ref 8.6–10.5)
CHLORIDE SERPL-SCNC: 100 MMOL/L (ref 98–107)
CK SERPL-CCNC: ABNORMAL U/L (ref 20–200)
CO2 SERPL-SCNC: 35 MMOL/L (ref 22–29)
CREAT BLD-MCNC: 0.83 MG/DL (ref 0.76–1.27)
DEPRECATED RDW RBC AUTO: 45.5 FL (ref 37–54)
EOSINOPHIL # BLD AUTO: 0.04 10*3/MM3 (ref 0–0.4)
EOSINOPHIL NFR BLD AUTO: 0.2 % (ref 0.3–6.2)
ERYTHROCYTE [DISTWIDTH] IN BLOOD BY AUTOMATED COUNT: 15.5 % (ref 12.3–15.4)
GAS FLOW AIRWAY: 3.5 LPM
GFR SERPL CREATININE-BSD FRML MDRD: 97 ML/MIN/1.73
GLOBULIN UR ELPH-MCNC: 2.8 GM/DL
GLUCOSE BLD-MCNC: 113 MG/DL (ref 65–99)
HCO3 BLDA-SCNC: 40.9 MMOL/L (ref 20–26)
HCT VFR BLD AUTO: 39.1 % (ref 37.5–51)
HGB BLD-MCNC: 13.2 G/DL (ref 13–17.7)
IMM GRANULOCYTES # BLD AUTO: 0.06 10*3/MM3 (ref 0–0.05)
IMM GRANULOCYTES NFR BLD AUTO: 0.4 % (ref 0–0.5)
LYMPHOCYTES # BLD AUTO: 2.06 10*3/MM3 (ref 0.7–3.1)
LYMPHOCYTES NFR BLD AUTO: 12 % (ref 19.6–45.3)
Lab: ABNORMAL
MCH RBC QN AUTO: 28 PG (ref 26.6–33)
MCHC RBC AUTO-ENTMCNC: 33.8 G/DL (ref 31.5–35.7)
MCV RBC AUTO: 83 FL (ref 79–97)
MODALITY: ABNORMAL
MONOCYTES # BLD AUTO: 0.95 10*3/MM3 (ref 0.1–0.9)
MONOCYTES NFR BLD AUTO: 5.5 % (ref 5–12)
NEUTROPHILS # BLD AUTO: 13.97 10*3/MM3 (ref 1.7–7)
NEUTROPHILS NFR BLD AUTO: 81.7 % (ref 42.7–76)
NRBC BLD AUTO-RTO: 0 /100 WBC (ref 0–0.2)
PCO2 BLDA: 51.1 MM HG (ref 35–45)
PH BLDA: 7.51 PH UNITS (ref 7.35–7.45)
PLATELET # BLD AUTO: 203 10*3/MM3 (ref 140–450)
PMV BLD AUTO: 10.9 FL (ref 6–12)
PO2 BLDA: 86.6 MM HG (ref 83–108)
POTASSIUM BLD-SCNC: 3.3 MMOL/L (ref 3.5–5.2)
PROT SERPL-MCNC: 6.3 G/DL (ref 6–8.5)
RBC # BLD AUTO: 4.71 10*6/MM3 (ref 4.14–5.8)
SAO2 % BLDCOA: 97.4 % (ref 94–99)
SODIUM BLD-SCNC: 148 MMOL/L (ref 136–145)
VENTILATOR MODE: ABNORMAL
WBC NRBC COR # BLD: 17.12 10*3/MM3 (ref 3.4–10.8)

## 2020-04-20 PROCEDURE — 80053 COMPREHEN METABOLIC PANEL: CPT | Performed by: STUDENT IN AN ORGANIZED HEALTH CARE EDUCATION/TRAINING PROGRAM

## 2020-04-20 PROCEDURE — 82550 ASSAY OF CK (CPK): CPT | Performed by: STUDENT IN AN ORGANIZED HEALTH CARE EDUCATION/TRAINING PROGRAM

## 2020-04-20 PROCEDURE — 82803 BLOOD GASES ANY COMBINATION: CPT

## 2020-04-20 PROCEDURE — 36600 WITHDRAWAL OF ARTERIAL BLOOD: CPT

## 2020-04-20 PROCEDURE — 99239 HOSP IP/OBS DSCHRG MGMT >30: CPT | Performed by: STUDENT IN AN ORGANIZED HEALTH CARE EDUCATION/TRAINING PROGRAM

## 2020-04-20 PROCEDURE — 25010000002 LORAZEPAM PER 2 MG: Performed by: STUDENT IN AN ORGANIZED HEALTH CARE EDUCATION/TRAINING PROGRAM

## 2020-04-20 PROCEDURE — 94799 UNLISTED PULMONARY SVC/PX: CPT

## 2020-04-20 PROCEDURE — 25010000002 LEVETIRACETAM IN NACL 0.82% 500 MG/100ML SOLUTION: Performed by: STUDENT IN AN ORGANIZED HEALTH CARE EDUCATION/TRAINING PROGRAM

## 2020-04-20 PROCEDURE — 85025 COMPLETE CBC W/AUTO DIFF WBC: CPT | Performed by: STUDENT IN AN ORGANIZED HEALTH CARE EDUCATION/TRAINING PROGRAM

## 2020-04-20 PROCEDURE — 25010000003 POTASSIUM CHLORIDE 10 MEQ/100ML SOLUTION: Performed by: STUDENT IN AN ORGANIZED HEALTH CARE EDUCATION/TRAINING PROGRAM

## 2020-04-20 RX ORDER — LORAZEPAM 2 MG/ML
1 INJECTION INTRAMUSCULAR EVERY 4 HOURS PRN
Status: DISCONTINUED | OUTPATIENT
Start: 2020-04-20 | End: 2020-04-20 | Stop reason: HOSPADM

## 2020-04-20 RX ORDER — SODIUM CHLORIDE 0.9 % (FLUSH) 0.9 %
10 SYRINGE (ML) INJECTION AS NEEDED
Status: DISCONTINUED | OUTPATIENT
Start: 2020-04-20 | End: 2020-04-20 | Stop reason: HOSPADM

## 2020-04-20 RX ORDER — FAMOTIDINE 20 MG/1
20 TABLET, FILM COATED ORAL DAILY
Status: DISCONTINUED | OUTPATIENT
Start: 2020-04-20 | End: 2020-04-20

## 2020-04-20 RX ORDER — LORAZEPAM 1 MG/1
1 TABLET ORAL EVERY 4 HOURS PRN
Status: DISCONTINUED | OUTPATIENT
Start: 2020-04-20 | End: 2020-04-20

## 2020-04-20 RX ORDER — SODIUM CHLORIDE 0.9 % (FLUSH) 0.9 %
10 SYRINGE (ML) INJECTION EVERY 12 HOURS SCHEDULED
Status: DISCONTINUED | OUTPATIENT
Start: 2020-04-20 | End: 2020-04-20 | Stop reason: HOSPADM

## 2020-04-20 RX ORDER — DOCUSATE SODIUM 100 MG/1
100 CAPSULE, LIQUID FILLED ORAL 2 TIMES DAILY PRN
Status: DISCONTINUED | OUTPATIENT
Start: 2020-04-20 | End: 2020-04-20 | Stop reason: HOSPADM

## 2020-04-20 RX ORDER — FAMOTIDINE 10 MG/ML
20 INJECTION, SOLUTION INTRAVENOUS DAILY
Status: DISCONTINUED | OUTPATIENT
Start: 2020-04-20 | End: 2020-04-20 | Stop reason: HOSPADM

## 2020-04-20 RX ORDER — LEVETIRACETAM 5 MG/ML
500 INJECTION INTRAVASCULAR EVERY 12 HOURS SCHEDULED
Status: DISCONTINUED | OUTPATIENT
Start: 2020-04-20 | End: 2020-04-20 | Stop reason: HOSPADM

## 2020-04-20 RX ORDER — POTASSIUM CHLORIDE 7.45 MG/ML
10 INJECTION INTRAVENOUS
Status: DISCONTINUED | OUTPATIENT
Start: 2020-04-20 | End: 2020-04-20 | Stop reason: HOSPADM

## 2020-04-20 RX ADMIN — SODIUM BICARBONATE 150 MEQ: 84 INJECTION, SOLUTION INTRAVENOUS at 05:19

## 2020-04-20 RX ADMIN — POTASSIUM CHLORIDE 10 MEQ: 7.46 INJECTION, SOLUTION INTRAVENOUS at 12:46

## 2020-04-20 RX ADMIN — SODIUM CHLORIDE 200 ML/HR: 900 INJECTION, SOLUTION INTRAVENOUS at 07:03

## 2020-04-20 RX ADMIN — SODIUM CHLORIDE 200 ML/HR: 900 INJECTION, SOLUTION INTRAVENOUS at 12:45

## 2020-04-20 RX ADMIN — LORAZEPAM 1 MG: 2 INJECTION INTRAMUSCULAR; INTRAVENOUS at 08:22

## 2020-04-20 RX ADMIN — LORAZEPAM 1 MG: 2 INJECTION INTRAMUSCULAR; INTRAVENOUS at 05:19

## 2020-04-20 RX ADMIN — LEVETIRACETAM 500 MG: 5 INJECTION INTRAVENOUS at 09:40

## 2020-04-20 RX ADMIN — SODIUM CHLORIDE, PRESERVATIVE FREE 10 ML: 5 INJECTION INTRAVENOUS at 05:18

## 2020-04-20 RX ADMIN — FAMOTIDINE 20 MG: 10 INJECTION INTRAVENOUS at 09:40

## 2020-04-20 NOTE — ED NOTES
Spoke with Chey VELEZ House Supervisor, states to make a code word with next of kin, Abraham and Yasemin, to provide update to them about patient due to patients inability to communicate at this time.      Hannah Claire RN  04/19/20 2037

## 2020-04-20 NOTE — ED NOTES
Family made code word- Damon.      Hannah Claire, RN  04/19/20 4626       Hannah Claire, RN  04/19/20 5158

## 2020-04-20 NOTE — ED NOTES
Spoke with Daughter, Yasemin, to update medications and history     Hannah Claire, RN  04/19/20 2058

## 2020-04-20 NOTE — ED NOTES
Spoke with patients daughter, Yasemin, using code word. She states she does not want her father removed or added any new medications until his PCP is consulted. She would like updates as well. Spoke with her that I will relay message to the RN who will be caring for him while admitted.      Hannah Claire RN  04/19/20 0566

## 2020-04-20 NOTE — PROGRESS NOTES
FAMILY MEDICINE DAILY PROGRESS NOTE    NAME: Quoc Conrad Jr.  : 1965  MRN: [id number]      LOS: 1 day     PROVIDER OF SERVICE: Kiel Cihldress MD    Chief Complaint: Non-traumatic rhabdomyolysis    Subjective:     Interval History:  History taken from: chart RN    Patient did become more responsive and agitated overnight.  He was given PRN Ativan overnight.  Patient was nonresponsive to verbal stimuli..  Nursing spoke to patient's family to assess baseline.  Patient did have abrasions on both knees and left elbow.  Unclear if patient had been crawling or the source of these.  Additionally left ankle was swollen, red.    Review of Systems:   Review of Systems   Unable to perform ROS: Acuity of condition       Objective:     Vital Signs  Temp:  [97.2 °F (36.2 °C)-98.4 °F (36.9 °C)] 97.2 °F (36.2 °C)  Heart Rate:  [] 98  Resp:  [10-23] 23  BP: (110-172)/() 157/96  Body mass index is 24.07 kg/m².    Physical Exam  Physical Exam   Constitutional: He appears well-developed. No distress.   HENT:   Head: Normocephalic and atraumatic.   Mouth/Throat: No oropharyngeal exudate.   Eyes: Right eye exhibits no discharge. Left eye exhibits no discharge. No scleral icterus.   Pupils 4 mm, minimally reactive to light   Cardiovascular: Normal rate, regular rhythm and normal heart sounds.   Pulmonary/Chest: Effort normal and breath sounds normal. He has no wheezes. He has no rales.   Abdominal: Soft. Bowel sounds are normal. He exhibits no distension. There is no tenderness.   Musculoskeletal: He exhibits no edema.        Right knee: He exhibits erythema.        Left knee: He exhibits erythema.        Arms:       Neurological: He is unresponsive.   Likely 2/2 ativan   Skin: Skin is warm and dry. He is not diaphoretic.   Vitals reviewed.      Medication Review    Current Facility-Administered Medications:   •  diatrizoate meglumine-sodium (GASTROGRAFIN) 66-10 % solution 30 mL, 30 mL, Oral, Once in  imaging, Kirby Workman Jr., MD  •  docusate sodium (COLACE) capsule 100 mg, 100 mg, Oral, BID PRN, Kirby Workman Jr., MD  •  famotidine (PEPCID) tablet 20 mg, 20 mg, Oral, Daily, Kirby Workman Jr., MD  •  iopamidol (ISOVUE-300) 61 % injection 75 mL, 75 mL, Intravenous, Once in imaging, Kirby Workman Jr., MD  •  LORazepam (ATIVAN) injection 1 mg, 1 mg, Intravenous, Q4H PRN, Kirby Workman Jr., MD, 1 mg at 04/20/20 0519  •  sodium bicarbonate 8.4 % 150 mEq in dextrose (D5W) 5 % 1,000 mL infusion (greater than 75 mEq), 150 mEq, Intravenous, Continuous, Kirby Workman Jr., MD, Last Rate: 200 mL/hr at 04/20/20 0519, 150 mEq at 04/20/20 0519  •  sodium chloride 0.9 % flush 10 mL, 10 mL, Intravenous, PRN, Kirby Workman Jr., MD  •  sodium chloride 0.9 % flush 10 mL, 10 mL, Intravenous, Q12H, Kirby Workman Jr., MD, 10 mL at 04/20/20 0518  •  sodium chloride 0.9 % flush 10 mL, 10 mL, Intravenous, PRN, Kirby Workman Jr., MD  •  sodium chloride 0.9 % infusion, 200 mL/hr, Intravenous, Continuous, Kirby Workman Jr., MD, Last Rate: 200 mL/hr at 04/20/20 0703, 200 mL/hr at 04/20/20 0703     Diagnostic Data    Lab Results (last 24 hours)     Procedure Component Value Units Date/Time    Extra Tubes [[id number]] Collected:  04/19/20 1912    Specimen:  Blood Updated:  04/19/20 2015    Narrative:       The following orders were created for panel order Extra Tubes.  Procedure                               Abnormality         Status                     ---------                               -----------         ------                     Light Blue Top[[id number]]                                   Final result               Lavender Top[[id number]]                                     Final result               Gold Top - SST[[id number]]                                   Final result               Green Top (Gel)[[id number]]                                  Final result                 Please view results for these tests on the  individual orders.    Light Blue Top [[id number]] Collected:  04/19/20 1912    Specimen:  Blood Updated:  04/19/20 2015     Extra Tube hold for add-on     Comment: Auto resulted       Lavender Top [[id number]] Collected:  04/19/20 1912    Specimen:  Blood Updated:  04/19/20 2015     Extra Tube hold for add-on     Comment: Auto resulted       Gold Top - SST [[id number]] Collected:  04/19/20 1913    Specimen:  Blood Updated:  04/19/20 2015     Extra Tube Hold for add-ons.     Comment: Auto resulted.       Green Top (Gel) [[id number]] Collected:  04/19/20 1912    Specimen:  Blood Updated:  04/19/20 2015     Extra Tube Hold for add-ons.     Comment: Auto resulted.       CK [[id number]]  (Abnormal) Collected:  04/19/20 1912    Specimen:  Blood Updated:  04/19/20 1949     Creatine Kinase 5,279 U/L     Ammonia [[id number]]  (Normal) Collected:  04/19/20 1912    Specimen:  Blood Updated:  04/19/20 1937     Ammonia 23 umol/L     [place] Draw [[id number]] Collected:  04/19/20 1824    Specimen:  Blood Updated:  04/19/20 1930    Narrative:       The following orders were created for panel order [place] Draw.  Procedure                               Abnormality         Status                     ---------                               -----------         ------                     Light Blue Top[[id number]]                                   Final result               Green Top (Gel)[[id number]]                                  Final result               Lavender Top[[id number]]                                     Final result               Gold Top - SST[[id number]]                                   Final result                 Please view results for these tests on the individual orders.    Light Blue Top [[id number]] Collected:  04/19/20 1824    Specimen:  Blood Updated:  04/19/20 1930     Extra Tube hold for add-on     Comment: Auto resulted       Green Top (Gel) [[id number]] Collected:  04/19/20 1824    Specimen:  Blood Updated:   04/19/20 1930     Extra Tube Hold for add-ons.     Comment: Auto resulted.       Lavender Top [[id number]] Collected:  04/19/20 1824    Specimen:  Blood Updated:  04/19/20 1930     Extra Tube hold for add-on     Comment: Auto resulted       Gold Top - SST [[id number]] Collected:  04/19/20 1824    Specimen:  Blood Updated:  04/19/20 1930     Extra Tube Hold for add-ons.     Comment: Auto resulted.       Urine Drug Screen - Urine, Clean Catch [[id number]]  (Normal) Collected:  04/19/20 1847    Specimen:  Urine, Clean Catch Updated:  04/19/20 1908     THC, Screen, Urine Negative     Phencyclidine (PCP), Urine Negative     Cocaine Screen, Urine Negative     Methamphetamine, Ur Negative     Opiate Screen Negative     Amphetamine Screen, Urine Negative     Benzodiazepine Screen, Urine Negative     Tricyclic Antidepressants Screen Negative     Methadone Screen, Urine Negative     Barbiturates Screen, Urine Negative     Oxycodone Screen, Urine Negative     Propoxyphene Screen Negative     Buprenorphine, Screen, Urine Negative    Narrative:       Cutoff For Drugs Screened:    Amphetamines               500 ng/ml  Barbiturates               200 ng/ml  Benzodiazepines            150 ng/ml  Cocaine                    150 ng/ml  Methadone                  200 ng/ml  Opiates                    100 ng/ml  Phencyclidine               25 ng/ml  THC                            50 ng/ml  Methamphetamine            500 ng/ml  Tricyclic Antidepressants  300 ng/ml  Oxycodone                  100 ng/ml  Propoxyphene               300 ng/ml  Buprenorphine               10 ng/ml    The normal value for all drugs tested is negative. This report includes unconfirmed screening results, with the cutoff values listed, to be used for medical treatment purposes only.  Unconfirmed results must not be used for non-medical purposes such as employment or legal testing.  Clinical consideration should be applied to any drug of abuse test, particularly when  unconfirmed results are used.      POC Glucose Once [[id number]]  (Abnormal) Collected:  04/19/20 1835    Specimen:  Blood Updated:  04/19/20 1902     Glucose 215 mg/dL      Comment: RN NotifiedOperator: [id number] BETSEY Dougherty ID: [id number]       Urinalysis With Microscopic If Indicated (No Culture) - Urine, Catheter [[id number]]  (Normal) Collected:  04/19/20 1848    Specimen:  Urine, Catheter Updated:  04/19/20 1858     Color, UA Yellow     Appearance, UA Clear     pH, UA 6.0     Specific Gravity, UA 1.007     Glucose, UA Negative     Ketones, UA Negative     Bilirubin, UA Negative     Blood, UA Negative     Protein, UA Negative     Leuk Esterase, UA Negative     Nitrite, UA Negative     Urobilinogen, UA 0.2 E.U./dL    Narrative:       Urine microscopic not indicated.    Blood Gas, Arterial [[id number]]  (Abnormal) Collected:  04/19/20 1845    Specimen:  Arterial Blood Updated:  04/19/20 1851     Site Right Brachial     Rayshawn's Test N/A     pH, Arterial 7.408 pH units      pCO2, Arterial 46.2 mm Hg      Comment: 83 Value above reference range        pO2, Arterial 165.0 mm Hg      Comment: 83 Value above reference range        HCO3, Arterial 29.1 mmol/L      Comment: 83 Value above reference range        Base Excess, Arterial 3.7 mmol/L      Comment: 83 Value above reference range        O2 Saturation, Arterial 99.4 %      Comment: 83 Value above reference range        Barometric Pressure for Blood Gas 741 mmHg      Modality Nasal Cannula     Flow Rate 5.0 lpm      Ventilator Mode NA     Collected by [facility]     Comment: Meter: [id number]     :  [id number]       Comprehensive Metabolic Panel [[id number]]  (Abnormal) Collected:  04/19/20 1824    Specimen:  Blood Updated:  04/19/20 1848     Glucose 110 mg/dL      BUN 12 mg/dL      Creatinine 1.73 mg/dL      Sodium 146 mmol/L      Potassium 3.7 mmol/L      Chloride 99 mmol/L      CO2 25.0 mmol/L      Calcium 9.4 mg/dL      Total Protein 6.4 g/dL       Albumin 3.70 g/dL      ALT (SGPT) 16 U/L      AST (SGOT) 51 U/L      Alkaline Phosphatase 116 U/L      Total Bilirubin 0.7 mg/dL      eGFR Non African Amer 41 mL/min/1.73      Globulin 2.7 gm/dL      A/G Ratio 1.4 g/dL      BUN/Creatinine Ratio 6.9     Anion Gap 22.0 mmol/L     Narrative:       GFR Normal >60  Chronic Kidney Disease <60  Kidney Failure <15      Acetaminophen Level [[id number]]  (Abnormal) Collected:  04/19/20 1824    Specimen:  Blood Updated:  04/19/20 1847     Acetaminophen <5.0 mcg/mL     Salicylate Level [[id number]]  (Normal) Collected:  04/19/20 1824    Specimen:  Blood Updated:  04/19/20 1847     Salicylate <0.3 mg/dL     Ethanol [[id number]] Collected:  04/19/20 1824    Specimen:  Blood Updated:  04/19/20 1847     Ethanol <10 mg/dL      Ethanol % <0.010 %     CBC & Differential [[id number]] Collected:  04/19/20 1824    Specimen:  Blood Updated:  04/19/20 1830    Narrative:       The following orders were created for panel order CBC & Differential.  Procedure                               Abnormality         Status                     ---------                               -----------         ------                     CBC Auto Differential[[id number]]        Abnormal            Final result                 Please view results for these tests on the individual orders.    CBC Auto Differential [[id number]]  (Abnormal) Collected:  04/19/20 1824    Specimen:  Blood Updated:  04/19/20 1830     WBC 18.89 10*3/mm3      RBC 4.55 10*6/mm3      Hemoglobin 12.7 g/dL      Hematocrit 37.4 %      MCV 82.2 fL      MCH 27.9 pg      MCHC 34.0 g/dL      RDW 15.0 %      RDW-SD 43.2 fl      MPV 11.3 fL      Platelets 218 10*3/mm3      Neutrophil % 89.3 %      Lymphocyte % 4.6 %      Monocyte % 5.5 %      Eosinophil % 0.0 %      Basophil % 0.2 %      Immature Grans % 0.4 %      Neutrophils, Absolute 16.87 10*3/mm3      Lymphocytes, Absolute 0.87 10*3/mm3      Monocytes, Absolute 1.04 10*3/mm3      Eosinophils,  Absolute 0.00 10*3/mm3      Basophils, Absolute 0.03 10*3/mm3      Immature Grans, Absolute 0.08 10*3/mm3      nRBC 0.0 /100 WBC             I reviewed the patient's new clinical results.    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     Hold home medications at this time  Will treat elevated BP with hydralazine for SBP > 160  Bladder Scan to check for urinary retention  Lorazepam 1 mg prn for agitation  Monitor closely     • NOEMÍ (acute kidney injury) (CMS/[facility]) [N17.9] Yes     Cr 1.73 on admission vs 0.80 baseline  Continue fluids  Avoid nephrotoxic agents  CMP daily         DVT prophylaxis: SCDs/TEDs  Code status is   Code Status and Medical Interventions:   Ordered at: 04/19/20 2158     Code Status:    CPR     Medical Interventions (Level of Support Prior to Arrest):    Full       Plan for disposition:Where: home health and SNF and When:  1 to 3 days      Time: 31 mins       This document has been electronically signed by Kiel Childress MD on April 20, 2020 08:01      EMR Dragon/transcription disclaimer: Much of this encounter note was created utilizing an electronic transcription/translation of spoken language to printed text.  Electronic translation of spoken language may permit erroneous, or at times, nonsensical words or phrases to be in advertently transcribed; although I have reviewed the note for such errors to the best of my ability, some may  still exist.

## 2020-04-20 NOTE — ED NOTES
Contact information   Yasemin Conrad [phone]    Abraham Conrad [phone]     Hannah Claire RN  04/19/20 2032

## 2020-04-20 NOTE — DISCHARGE SUMMARY
DISCHARGE SUMMARY    PATIENT NAME: Quoc Conrad Jr.       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       SERVICE: [facility]  Attending: Deandre Lopez MD  Resident: Kiel Childress MD    CONSULTS:   Consult Orders (all) (From admission, onward)    None          PROCEDURES:       HISTORY OF PRESENT ILLNESS:     Quoc Conrad Jr. is a 54 y.o. male with a CMH of depression & seizures who presents via EMS from group home for altered mental status.  Patient was not able to communicate on interview due to ativan received in ER, and history was obtained from ER physician & EMS.  Patient was apparently crawling on the floor & unresponsive to questions at the group home.  Per EMS, patient did not have any known ingestions, fevers, episodes of vomiting, or any seizures.    Per Dr. Workman's H&P, 2020,     DIAGNOSTIC DATA:     Lab Results (last 24 hours)     Procedure Component Value Units Date/Time    Blood Gas, Arterial [[id number]]  (Abnormal) Collected:  20 1115    Specimen:  Arterial Blood Updated:  20 1130     Site Left Radial     Rayshawn's Test N/A     pH, Arterial 7.511 pH units      Comment: 83 Value above reference range        pCO2, Arterial 51.1 mm Hg       Comment: 83 Value above reference range        pO2, Arterial 86.6 mm Hg      HCO3, Arterial 40.9 mmol/L      Comment: 83 Value above reference range        Base Excess, Arterial 15.6 mmol/L      Comment: 83 Value above reference range        O2 Saturation, Arterial 97.4 %      Barometric Pressure for Blood Gas 743 mmHg      Modality N/A     Flow Rate 3.5 lpm      Ventilator Mode NA     Collected by PM     Comment: Meter: [id number]     :  [id number]       CK [[id number]]  (Abnormal) Collected:  04/20/20 0858    Specimen:  Blood Updated:  04/20/20 0935     Creatine Kinase 14,916 U/L     Comprehensive Metabolic Panel [[id number]]  (Abnormal) Collected:  04/20/20 0858    Specimen:  Blood Updated:  04/20/20 0923     Glucose 113 mg/dL      BUN 8 mg/dL      Creatinine 0.83 mg/dL      Sodium 148 mmol/L      Potassium 3.3 mmol/L      Chloride 100 mmol/L      CO2 35.0 mmol/L      Calcium 8.5 mg/dL      Total Protein 6.3 g/dL      Albumin 3.50 g/dL      ALT (SGPT) 29 U/L      AST (SGOT) 181 U/L      Alkaline Phosphatase 115 U/L      Total Bilirubin 0.5 mg/dL      eGFR Non African Amer 97 mL/min/1.73      Globulin 2.8 gm/dL      A/G Ratio 1.3 g/dL      BUN/Creatinine Ratio 9.6     Anion Gap 13.0 mmol/L     Narrative:       GFR Normal >60  Chronic Kidney Disease <60  Kidney Failure <15      CBC & Differential [[id number]] Collected:  04/20/20 0858    Specimen:  Blood Updated:  04/20/20 0905    Narrative:       The following orders were created for panel order CBC & Differential.  Procedure                               Abnormality         Status                     ---------                               -----------         ------                     CBC Auto Differential[[id number]]        Abnormal            Final result                 Please view results for these tests on the individual orders.    CBC Auto Differential [[id number]]  (Abnormal) Collected:  04/20/20 0858    Specimen:  Blood Updated:  04/20/20 0905      WBC 17.12 10*3/mm3      RBC 4.71 10*6/mm3      Hemoglobin 13.2 g/dL      Hematocrit 39.1 %      MCV 83.0 fL      MCH 28.0 pg      MCHC 33.8 g/dL      RDW 15.5 %      RDW-SD 45.5 fl      MPV 10.9 fL      Platelets 203 10*3/mm3      Neutrophil % 81.7 %      Lymphocyte % 12.0 %      Monocyte % 5.5 %      Eosinophil % 0.2 %      Basophil % 0.2 %      Immature Grans % 0.4 %      Neutrophils, Absolute 13.97 10*3/mm3      Lymphocytes, Absolute 2.06 10*3/mm3      Monocytes, Absolute 0.95 10*3/mm3      Eosinophils, Absolute 0.04 10*3/mm3      Basophils, Absolute 0.04 10*3/mm3      Immature Grans, Absolute 0.06 10*3/mm3      nRBC 0.0 /100 WBC     Extra Tubes [[id number]] Collected:  04/19/20 1912    Specimen:  Blood Updated:  04/19/20 2015    Narrative:       The following orders were created for panel order Extra Tubes.  Procedure                               Abnormality         Status                     ---------                               -----------         ------                     Light Blue Top[[id number]]                                   Final result               Lavender Top[[id number]]                                     Final result               Gold Top - SST[[id number]]                                   Final result               Green Top (Gel)[[id number]]                                  Final result                 Please view results for these tests on the individual orders.    Light Blue Top [[id number]] Collected:  04/19/20 1912    Specimen:  Blood Updated:  04/19/20 2015     Extra Tube hold for add-on     Comment: Auto resulted       Lavender Top [[id number]] Collected:  04/19/20 1912    Specimen:  Blood Updated:  04/19/20 2015     Extra Tube hold for add-on     Comment: Auto resulted       Gold Top - SST [[id number]] Collected:  04/19/20 1913    Specimen:  Blood Updated:  04/19/20 2015     Extra Tube Hold for add-ons.     Comment: Auto resulted.       Green Top (Gel) [[id number]] Collected:  04/19/20  1912    Specimen:  Blood Updated:  04/19/20 2015     Extra Tube Hold for add-ons.     Comment: Auto resulted.       CK [[id number]]  (Abnormal) Collected:  04/19/20 1912    Specimen:  Blood Updated:  04/19/20 1949     Creatine Kinase 5,279 U/L     Ammonia [[id number]]  (Normal) Collected:  04/19/20 1912    Specimen:  Blood Updated:  04/19/20 1937     Ammonia 23 umol/L     [place] Draw [[id number]] Collected:  04/19/20 1824    Specimen:  Blood Updated:  04/19/20 1930    Narrative:       The following orders were created for panel order [place] Draw.  Procedure                               Abnormality         Status                     ---------                               -----------         ------                     Light Blue Top[[id number]]                                   Final result               Green Top (Gel)[[id number]]                                  Final result               Lavender Top[[id number]]                                     Final result               Gold Top - SST[[id number]]                                   Final result                 Please view results for these tests on the individual orders.    Light Blue Top [[id number]] Collected:  04/19/20 1824    Specimen:  Blood Updated:  04/19/20 1930     Extra Tube hold for add-on     Comment: Auto resulted       Green Top (Gel) [[id number]] Collected:  04/19/20 1824    Specimen:  Blood Updated:  04/19/20 1930     Extra Tube Hold for add-ons.     Comment: Auto resulted.       Lavender Top [[id number]] Collected:  04/19/20 1824    Specimen:  Blood Updated:  04/19/20 1930     Extra Tube hold for add-on     Comment: Auto resulted       Gold Top - SST [[id number]] Collected:  04/19/20 1824    Specimen:  Blood Updated:  04/19/20 1930     Extra Tube Hold for add-ons.     Comment: Auto resulted.       Urine Drug Screen - Urine, Clean Catch [[id number]]  (Normal) Collected:  04/19/20 1847    Specimen:  Urine, Clean Catch Updated:  04/19/20 1908     THC,  Screen, Urine Negative     Phencyclidine (PCP), Urine Negative     Cocaine Screen, Urine Negative     Methamphetamine, Ur Negative     Opiate Screen Negative     Amphetamine Screen, Urine Negative     Benzodiazepine Screen, Urine Negative     Tricyclic Antidepressants Screen Negative     Methadone Screen, Urine Negative     Barbiturates Screen, Urine Negative     Oxycodone Screen, Urine Negative     Propoxyphene Screen Negative     Buprenorphine, Screen, Urine Negative    Narrative:       Cutoff For Drugs Screened:    Amphetamines               500 ng/ml  Barbiturates               200 ng/ml  Benzodiazepines            150 ng/ml  Cocaine                    150 ng/ml  Methadone                  200 ng/ml  Opiates                    100 ng/ml  Phencyclidine               25 ng/ml  THC                            50 ng/ml  Methamphetamine            500 ng/ml  Tricyclic Antidepressants  300 ng/ml  Oxycodone                  100 ng/ml  Propoxyphene               300 ng/ml  Buprenorphine               10 ng/ml    The normal value for all drugs tested is negative. This report includes unconfirmed screening results, with the cutoff values listed, to be used for medical treatment purposes only.  Unconfirmed results must not be used for non-medical purposes such as employment or legal testing.  Clinical consideration should be applied to any drug of abuse test, particularly when unconfirmed results are used.      POC Glucose Once [[id number]]  (Abnormal) Collected:  04/19/20 1835    Specimen:  Blood Updated:  04/19/20 1902     Glucose 215 mg/dL      Comment: RN NotifiedOperator: [id number] BETSEY GRANGERMeter ID: [id number]       Urinalysis With Microscopic If Indicated (No Culture) - Urine, Catheter [[id number]]  (Normal) Collected:  04/19/20 1848    Specimen:  Urine, Catheter Updated:  04/19/20 1858     Color, UA Yellow     Appearance, UA Clear     pH, UA 6.0     Specific Gravity, UA 1.007     Glucose, UA Negative     Ketones,  UA Negative     Bilirubin, UA Negative     Blood, UA Negative     Protein, UA Negative     Leuk Esterase, UA Negative     Nitrite, UA Negative     Urobilinogen, UA 0.2 E.U./dL    Narrative:       Urine microscopic not indicated.    Blood Gas, Arterial [[id number]]  (Abnormal) Collected:  04/19/20 1845    Specimen:  Arterial Blood Updated:  04/19/20 1851     Site Right Brachial     Rayshawn's Test N/A     pH, Arterial 7.408 pH units      pCO2, Arterial 46.2 mm Hg      Comment: 83 Value above reference range        pO2, Arterial 165.0 mm Hg      Comment: 83 Value above reference range        HCO3, Arterial 29.1 mmol/L      Comment: 83 Value above reference range        Base Excess, Arterial 3.7 mmol/L      Comment: 83 Value above reference range        O2 Saturation, Arterial 99.4 %      Comment: 83 Value above reference range        Barometric Pressure for Blood Gas 741 mmHg      Modality Nasal Cannula     Flow Rate 5.0 lpm      Ventilator Mode NA     Collected by BUZZ ALVARADO     Comment: Meter: [id number]     :  [id number]       Comprehensive Metabolic Panel [[id number]]  (Abnormal) Collected:  04/19/20 1824    Specimen:  Blood Updated:  04/19/20 1848     Glucose 110 mg/dL      BUN 12 mg/dL      Creatinine 1.73 mg/dL      Sodium 146 mmol/L      Potassium 3.7 mmol/L      Chloride 99 mmol/L      CO2 25.0 mmol/L      Calcium 9.4 mg/dL      Total Protein 6.4 g/dL      Albumin 3.70 g/dL      ALT (SGPT) 16 U/L      AST (SGOT) 51 U/L      Alkaline Phosphatase 116 U/L      Total Bilirubin 0.7 mg/dL      eGFR Non African Amer 41 mL/min/1.73      Globulin 2.7 gm/dL      A/G Ratio 1.4 g/dL      BUN/Creatinine Ratio 6.9     Anion Gap 22.0 mmol/L     Narrative:       GFR Normal >60  Chronic Kidney Disease <60  Kidney Failure <15      Acetaminophen Level [[id number]]  (Abnormal) Collected:  04/19/20 1824    Specimen:  Blood Updated:  04/19/20 1847     Acetaminophen <5.0 mcg/mL     Salicylate Level [[id number]]  (Normal)  Collected:  04/19/20 1824    Specimen:  Blood Updated:  04/19/20 1847     Salicylate <0.3 mg/dL     Ethanol [[id number]] Collected:  04/19/20 1824    Specimen:  Blood Updated:  04/19/20 1847     Ethanol <10 mg/dL      Ethanol % <0.010 %     CBC & Differential [[id number]] Collected:  04/19/20 1824    Specimen:  Blood Updated:  04/19/20 1830    Narrative:       The following orders were created for panel order CBC & Differential.  Procedure                               Abnormality         Status                     ---------                               -----------         ------                     CBC Auto Differential[[id number]]        Abnormal            Final result                 Please view results for these tests on the individual orders.    CBC Auto Differential [[id number]]  (Abnormal) Collected:  04/19/20 1824    Specimen:  Blood Updated:  04/19/20 1830     WBC 18.89 10*3/mm3      RBC 4.55 10*6/mm3      Hemoglobin 12.7 g/dL      Hematocrit 37.4 %      MCV 82.2 fL      MCH 27.9 pg      MCHC 34.0 g/dL      RDW 15.0 %      RDW-SD 43.2 fl      MPV 11.3 fL      Platelets 218 10*3/mm3      Neutrophil % 89.3 %      Lymphocyte % 4.6 %      Monocyte % 5.5 %      Eosinophil % 0.0 %      Basophil % 0.2 %      Immature Grans % 0.4 %      Neutrophils, Absolute 16.87 10*3/mm3      Lymphocytes, Absolute 0.87 10*3/mm3      Monocytes, Absolute 1.04 10*3/mm3      Eosinophils, Absolute 0.00 10*3/mm3      Basophils, Absolute 0.03 10*3/mm3      Immature Grans, Absolute 0.08 10*3/mm3      nRBC 0.0 /100 WBC         Ct Head Without Contrast    Result Date: 4/19/2020  CT Head Without Contrast History: Altered mental status Axial scans of the brain were obtained without intravenous contrast.  Coronal and sagital reconstructions were preformed. This exam was performed according to our departmental dose-optimization program, which includes automated exposure control, adjustment of the mA and/or kV according to patient size and/or use  of iterative reconstruction technique. DLP: 958.40 Comparison: April 17, 2020 Findings: Previously demonstrated 6 mm linear metallic density is as needed table of the anterior right parietal bone. Stable deformity high left frontal parietal bone which may be posttraumatic or postoperative in nature. The visualized paranasal sinuses are unremarkable. No acute process. Minimal cerebral atrophy. No hemorrhage. No mass. No abnormal areas of increased or decreased attenuation. No midline shift. No abnormal extra-axial fluid collections.     CONCLUSION: No acute process. Minimal cerebral atrophy. [id number] Electronically signed by:  Amauri Mcneill MD  4/19/2020 8:55 PM CDT Workstation: [id number]    Ct Head Without Contrast    Result Date: 4/17/2020  EXAMINATION:  CT SCAN OF THE HEAD WITHOUT INTRAVENOUS CONTRAST CLINICAL INFORMATION:  tremor This exam was performed using radiation doses that are as low as reasonably achievable (ALARA). This exam was performed according to our departmental dose optimization program, which includes automated exposure control, adjustment of the mA and/or KV according to patient size and/or use of iterative reconstruction technique. COMPARISON: None available. TECHNIQUE:  Axial images from skull base to vertex.  FINDINGS: There is no evidence of intracranial hemorrhage, parenchymal mass, midline shift, or focal mass effect. There is no hydrocephalus or effacement of the basilar cisterns. There is no extra-axial hemorrhage or collection identified. The mastoid air cells and visualized paranasal sinuses appear clear.       No evidence of intracranial hemorrhage, mass effect or large acute infarct. Electronically signed by:  Chaim Gayle MD  4/17/2020 8:00 AM CDT Workstation: [id number]    Xr Chest 1 View    Result Date: 4/19/2020  PORTABLE CHEST HISTORY: Altered mental status Portable AP upright film of the chest was obtained at 6:18 PM. COMPARISON: February 11, 2011 FINDINGS: EKG leads. The lungs are  clear of an acute process. The heart is not enlarged. The pulmonary vasculature is not increased. No pleural effusion. No pneumothorax. No acute osseous abnormality. Old fracture of the left third rib.     CONCLUSION: No Acute Disease [id number] Electronically signed by:  mAauri Mcneill MD  4/19/2020 6:34 PM CDT Workstation: [id number]       HOSPITAL COURSE:    Patient was admitted to the ED for nontraumatic rhabdomyolysis, NOEMÍ and anticholinergic syndrome.  He had been seen in our ED 3 times before this admission for tremors.  In the ED, he was altered and became agitated after being given Narcan.  He did have dry skin, altered mental status hypertension and takes mirtazapine benztropine, Celexa, trazodone and valbenazine at home.  His initial CK was greater than 5000.  He was started on bicarb drip and normal saline at 200 mL/h.  Due to his agitation he received 2 mg Ativan.  When the admitting team arrived, the 2 mg Ativan had the patient fairly sedated.  No further history was able to be gathered.  EKG was normal sinus rhythm, head CT showed no acute processes.  On day of discharge CBC was remarkable for an elevated white blood cell count with elevated neutrophil percentage.  CMP showed hyponatremia to 148 and hypokalemia of 3.3.  His CK trended up to 14,916.  On exam this morning he was rather sedated/lethargic but would respond to some questioning.  Unclear of his level of insight.  While rounding this morning he was intermittently tremulous.  ABG showed a pH of 7.511 with a CO2 of 51.1 and HCO3 of 40.9.  His bicarb drip was stopped.  We began to replace potassium via IV due to him not tolerating p.o. intake.  With his worsening CK and significant tremors we expected that his rhabdomyolysis may continue to worsen.  We believe the patient required a higher level of care to a center with neurology to assess his altered mental status and source of tremors as well as ruling out status epilepticus.  Dr. Ricky Rose at  [facility] was contacted through the transfer line ([phone]).  After discussing the case with Dr. Anali Spence she accepted and requested that the patient be transferred via helicopter.  Our  was reached out to about arranging transport.  Patient was stable upon discharge.    DISCHARGE CONDITION:   Stable for transfer    DISPOSITION:   To [facility] via helicopter    DISCHARGE MEDICATIONS     Discharge Medications      Continue These Medications      Instructions Start Date   amLODIPine 10 MG tablet  Commonly known as:  NORVASC   5 mg, Oral, Daily      atorvastatin 40 MG tablet  Commonly known as:  LIPITOR   20 mg, Oral, Daily      baclofen 10 MG tablet  Commonly known as:  LIORESAL   20 mg, Oral, 3 Times Daily      benztropine 1 MG tablet  Commonly known as:  COGENTIN   1 mg, Oral, 2 Times Daily      escitalopram 20 MG tablet  Commonly known as:  LEXAPRO   10 mg, Oral, Daily      famotidine 20 MG tablet  Commonly known as:  PEPCID   20 mg, Oral, Nightly      folic acid 1 MG tablet  Commonly known as:  FOLVITE   1 mg, Oral, Daily      hydrOXYzine 25 MG tablet  Commonly known as:  ATARAX   25 mg, Oral, Every 6 Hours PRN      levETIRAcetam 500 MG tablet  Commonly known as:  KEPPRA   500 mg, Oral, 2 Times Daily      lisinopril 40 MG tablet  Commonly known as:  PRINIVIL,ZESTRIL   40 mg, Oral, Daily      LORazepam 1 MG tablet  Commonly known as:  ATIVAN   1 mg, Oral, 2 Times Daily PRN      mirtazapine 30 MG tablet  Commonly known as:  REMERON   15 mg, Oral, Nightly      pantoprazole 40 MG EC tablet  Commonly known as:  PROTONIX   40 mg, Oral, Daily      potassium chloride ER 20 MEQ tablet controlled-release ER tablet  Commonly known as:  K-TAB   20 mEq, Oral, Daily      traZODone 100 MG tablet  Commonly known as:  DESYREL   100 mg, Oral, Nightly      Valbenazine Tosylate 80 MG capsule   80 mg, Oral, Daily             INSTRUCTIONS:  Activity:   Activity Instructions     Activity as Tolerated       Activity as Tolerated          Diet:   Diet Instructions     Diet: Nothing By Mouth      Discharge Diet:  Nothing By Mouth    When able to tolerate PO, may have a regular, cardiac diet    Diet: Nothing By Mouth      Discharge Diet:  Nothing By Mouth    Once he is able to tolerate PO intake, he can have a regular cardiac diet          FOLLOW UP:   Additional Instructions for the Follow-ups that You Need to Schedule     Discharge Follow-up with Specified Provider: Family Medicine [facility]; 1 Week   As directed      To:  [facility] Medicine [facility]idency    Follow Up:  1 Week    Follow Up Details:  Hospital discharge follow up - once you are discharged from [facility]           Follow-up Information     Provider, No Known .    Contact information:  [facility] [address]                   PENDING TEST RESULTS AT DISCHARGE      Time: >30 minutes was spent in discharge planning, medication reconciliation and coordination of care for this patient.    Deandre Lopez MD is the attending at time of discharge, He is aware of the patient's status and agrees with the above discharge summary.      This document has been electronically signed by Kiel Childress MD on April 20, 2020 14:42      EMR Dragon/transcription disclaimer: Much of this encounter note was created utilizing an electronic transcription/translation of spoken language to printed text.  Electronic translation of spoken language may permit erroneous, or at times, nonsensical words or phrases to be in advertently transcribed; although I have reviewed the note for such errors to the best of my ability, some may still exist.

## 2020-04-20 NOTE — H&P
HISTORY AND PHYSICAL  NAME: Quoc Conrad Jr.  : 1965  MRN: [id number]    DATE OF ADMISSION:  2020     DATE & TIME SEEN: 20 at 2100    PCP: Gina Herzog Known    CODE STATUS: Full code    CHIEF COMPLAINT:  Altered Mental Status    HPI:  Quoc Conrad Jr. is a 54 y.o. male with a CMH of depression & seizures who presents via EMS from group home for altered mental status.  Patient was not able to communicate on interview due to ativan received in ER, and history was obtained from ER physician & EMS.  Patient was apparently crawling on the floor & unresponsive to questions at the group home.  Per EMS, patient did not have any known ingestions, fevers, episodes of vomiting, or any seizures.      CONCURRENT MEDICAL HISTORY:  Past Medical History:   Diagnosis Date   • Depression    • Hyperlipidemia    • Hypertension    • Seizures (CMS/HCC)    • Tremors of nervous system        PAST SURGICAL HISTORY:  Past Surgical History:   Procedure Laterality Date   • BACK SURGERY     • BRAIN SURGERY         FAMILY HISTORY:  History reviewed. No pertinent family history.     SOCIAL HISTORY:  Social History     Socioeconomic History   • Marital status: Single     Spouse name: Not on file   • Number of children: Not on file   • Years of education: Not on file   • Highest education level: Not on file   Tobacco Use   • Smoking status: Current Every Day Smoker     Packs/day: 2.00     Types: Cigarettes   • Smokeless tobacco: Never Used   Substance and Sexual Activity   • Alcohol use: No   • Drug use: No   • Sexual activity: Defer   Social History Narrative    Pt lives by himself, unemployed       HOME MEDICATIONS:  Prior to Admission medications    Medication Sig Start Date End Date Taking? Authorizing Provider   baclofen (LIORESAL) 10 MG tablet Take 10 mg by mouth 3 (Three) Times a Day.   Yes Provider, MD Sinan   benztropine (COGENTIN) 1 MG tablet Take 1 mg by mouth 2 (Two) Times a Day.   Yes  Provider, MD Sinan   hydrOXYzine (ATARAX) 25 MG tablet Take 1 tablet by mouth Every 6 (Six) Hours As Needed (tremor). 4/17/20  Yes Jacek Penaloza MD   potassium chloride (K-TAB) 20 MEQ tablet controlled-release ER tablet Take 1 tablet by mouth Daily. 4/13/20  Yes Danny Story MD   levETIRAcetam (KEPPRA) 500 MG tablet Take 1 tablet by mouth 2 (Two) Times a Day. 7/15/17   Dionicio Wayne MD   LORazepam (ATIVAN) 1 MG tablet Take 1 tablet by mouth 2 (Two) Times a Day As Needed for Anxiety. 10/7/18   Amauri Tatum MD       ALLERGIES:  Gabapentin; Hydrocodone; Simvastatin; and Trazodone    REVIEW OF SYSTEMS  Review of Systems   Unable to perform ROS: Mental status change   Psychiatric/Behavioral: Positive for agitation, behavioral problems and confusion.       PHYSICAL EXAM:  Temp:  [98.4 °F (36.9 °C)] 98.4 °F (36.9 °C)  Heart Rate:  [] 76  Resp:  [12-18] 12  BP: (110-151)/(61-88) 122/79  Body mass index is 24.33 kg/m².  Physical Exam   Constitutional: Vital signs are normal. He appears well-developed. He is uncooperative. He appears ill. He appears distressed. He is sedated.   HENT:   Head: Normocephalic and atraumatic.   Mouth/Throat: Mucous membranes are pale and dry. No oropharyngeal exudate.   Eyes: Right eye exhibits no discharge. Left eye exhibits no discharge. No scleral icterus. Right pupil is not reactive. Left pupil is not reactive.   Positive for Mydriasis   Neck: Normal range of motion. Neck supple. No tracheal deviation present. No thyromegaly present.   Cardiovascular: Normal rate, regular rhythm, S1 normal, S2 normal, normal heart sounds and intact distal pulses. Exam reveals no gallop and no friction rub.   No murmur heard.  Pulmonary/Chest: Effort normal and breath sounds normal. No respiratory distress.   Abdominal: Soft. Bowel sounds are normal. He exhibits no distension. There is no tenderness.   Musculoskeletal: Normal range of motion. He exhibits no edema, tenderness or  deformity.   Neurological: He is disoriented and unresponsive.   Unresponsive 2/2 sedation with Ativan   Skin: Skin is warm and dry. Capillary refill takes less than 2 seconds. No rash noted. He is not diaphoretic. No erythema.   Psychiatric: He expresses no suicidal plans and no homicidal plans.   Vitals reviewed.      DIAGNOSTIC DATA:   Lab Results (last 24 hours)     Procedure Component Value Units Date/Time    Extra Tubes [[id number]] Collected:  04/19/20 1912    Specimen:  Blood Updated:  04/19/20 2015    Narrative:       The following orders were created for panel order Extra Tubes.  Procedure                               Abnormality         Status                     ---------                               -----------         ------                     Light Blue Top[[id number]]                                   Final result               Lavender Top[[id number]]                                     Final result               Gold Top - SST[[id number]]                                   Final result               Green Top (Gel)[[id number]]                                  Final result                 Please view results for these tests on the individual orders.    Light Blue Top [[id number]] Collected:  04/19/20 1912    Specimen:  Blood Updated:  04/19/20 2015     Extra Tube hold for add-on     Comment: Auto resulted       Lavender Top [[id number]] Collected:  04/19/20 1912    Specimen:  Blood Updated:  04/19/20 2015     Extra Tube hold for add-on     Comment: Auto resulted       Gold Top - SST [[id number]] Collected:  04/19/20 1913    Specimen:  Blood Updated:  04/19/20 2015     Extra Tube Hold for add-ons.     Comment: Auto resulted.       Green Top (Gel) [[id number]] Collected:  04/19/20 1912    Specimen:  Blood Updated:  04/19/20 2015     Extra Tube Hold for add-ons.     Comment: Auto resulted.       CK [[id number]]  (Abnormal) Collected:  04/19/20 1912    Specimen:  Blood Updated:  04/19/20 1949     Creatine  Kinase 5,279 U/L     Ammonia [[id number]]  (Normal) Collected:  04/19/20 1912    Specimen:  Blood Updated:  04/19/20 1937     Ammonia 23 umol/L     [facility] [[id number]] Collected:  04/19/20 1824    Specimen:  Blood Updated:  04/19/20 1930    Narrative:       The following orders were created for panel order [facility].  Procedure                               Abnormality         Status                     ---------                               -----------         ------                     Light Blue Top[[id number]]                                   Final result               Green Top (Gel)[[id number]]                                  Final result               Lavender Top[[id number]]                                     Final result               Gold Top - SST[[id number]]                                   Final result                 Please view results for these tests on the individual orders.    Light Blue Top [[id number]] Collected:  04/19/20 1824    Specimen:  Blood Updated:  04/19/20 1930     Extra Tube hold for add-on     Comment: Auto resulted       Green Top (Gel) [[id number]] Collected:  04/19/20 1824    Specimen:  Blood Updated:  04/19/20 1930     Extra Tube Hold for add-ons.     Comment: Auto resulted.       Lavender Top [[id number]] Collected:  04/19/20 1824    Specimen:  Blood Updated:  04/19/20 1930     Extra Tube hold for add-on     Comment: Auto resulted       Gold Top - SST [[id number]] Collected:  04/19/20 1824    Specimen:  Blood Updated:  04/19/20 1930     Extra Tube Hold for add-ons.     Comment: Auto resulted.       Urine Drug Screen - Urine, Clean Catch [[id number]]  (Normal) Collected:  04/19/20 1847    Specimen:  Urine, Clean Catch Updated:  04/19/20 1908     THC, Screen, Urine Negative     Phencyclidine (PCP), Urine Negative     Cocaine Screen, Urine Negative     Methamphetamine, Ur Negative     Opiate Screen Negative     Amphetamine Screen, Urine Negative     Benzodiazepine Screen, Urine  Negative     Tricyclic Antidepressants Screen Negative     Methadone Screen, Urine Negative     Barbiturates Screen, Urine Negative     Oxycodone Screen, Urine Negative     Propoxyphene Screen Negative     Buprenorphine, Screen, Urine Negative    Narrative:       Cutoff For Drugs Screened:    Amphetamines               500 ng/ml  Barbiturates               200 ng/ml  Benzodiazepines            150 ng/ml  Cocaine                    150 ng/ml  Methadone                  200 ng/ml  Opiates                    100 ng/ml  Phencyclidine               25 ng/ml  THC                            50 ng/ml  Methamphetamine            500 ng/ml  Tricyclic Antidepressants  300 ng/ml  Oxycodone                  100 ng/ml  Propoxyphene               300 ng/ml  Buprenorphine               10 ng/ml    The normal value for all drugs tested is negative. This report includes unconfirmed screening results, with the cutoff values listed, to be used for medical treatment purposes only.  Unconfirmed results must not be used for non-medical purposes such as employment or legal testing.  Clinical consideration should be applied to any drug of abuse test, particularly when unconfirmed results are used.      POC Glucose Once [[id number]]  (Abnormal) Collected:  04/19/20 1835    Specimen:  Blood Updated:  04/19/20 1902     Glucose 215 mg/dL      Comment: RN NotifiedOperator: [id number] [facility] ID: [id number]       Urinalysis With Microscopic If Indicated (No Culture) - Urine, Catheter [[id number]]  (Normal) Collected:  04/19/20 1848    Specimen:  Urine, Catheter Updated:  04/19/20 1858     Color, UA Yellow     Appearance, UA Clear     pH, UA 6.0     Specific Gravity, UA 1.007     Glucose, UA Negative     Ketones, UA Negative     Bilirubin, UA Negative     Blood, UA Negative     Protein, UA Negative     Leuk Esterase, UA Negative     Nitrite, UA Negative     Urobilinogen, UA 0.2 E.U./dL    Narrative:       Urine microscopic not indicated.     Blood Gas, Arterial [[id number]]  (Abnormal) Collected:  04/19/20 1845    Specimen:  Arterial Blood Updated:  04/19/20 1851     Site Right Brachial     Rayshawn's Test N/A     pH, Arterial 7.408 pH units      pCO2, Arterial 46.2 mm Hg      Comment: 83 Value above reference range        pO2, Arterial 165.0 mm Hg      Comment: 83 Value above reference range        HCO3, Arterial 29.1 mmol/L      Comment: 83 Value above reference range        Base Excess, Arterial 3.7 mmol/L      Comment: 83 Value above reference range        O2 Saturation, Arterial 99.4 %      Comment: 83 Value above reference range        Barometric Pressure for Blood Gas 741 mmHg      Modality Nasal Cannula     Flow Rate 5.0 lpm      Ventilator Mode NA     Collected by BUZZ ALVARADO     Comment: Meter: [id number]     :  [id number]       Comprehensive Metabolic Panel [[id number]]  (Abnormal) Collected:  04/19/20 1824    Specimen:  Blood Updated:  04/19/20 1848     Glucose 110 mg/dL      BUN 12 mg/dL      Creatinine 1.73 mg/dL      Sodium 146 mmol/L      Potassium 3.7 mmol/L      Chloride 99 mmol/L      CO2 25.0 mmol/L      Calcium 9.4 mg/dL      Total Protein 6.4 g/dL      Albumin 3.70 g/dL      ALT (SGPT) 16 U/L      AST (SGOT) 51 U/L      Alkaline Phosphatase 116 U/L      Total Bilirubin 0.7 mg/dL      eGFR Non African Amer 41 mL/min/1.73      Globulin 2.7 gm/dL      A/G Ratio 1.4 g/dL      BUN/Creatinine Ratio 6.9     Anion Gap 22.0 mmol/L     Narrative:       GFR Normal >60  Chronic Kidney Disease <60  Kidney Failure <15      Acetaminophen Level [[id number]]  (Abnormal) Collected:  04/19/20 1824    Specimen:  Blood Updated:  04/19/20 1847     Acetaminophen <5.0 mcg/mL     Salicylate Level [[id number]]  (Normal) Collected:  04/19/20 1824    Specimen:  Blood Updated:  04/19/20 1847     Salicylate <0.3 mg/dL     Ethanol [[id number]] Collected:  04/19/20 1824    Specimen:  Blood Updated:  04/19/20 1847     Ethanol <10 mg/dL      Ethanol % <0.010  %     CBC & Differential [[id number]] Collected:  04/19/20 1824    Specimen:  Blood Updated:  04/19/20 1830    Narrative:       The following orders were created for panel order CBC & Differential.  Procedure                               Abnormality         Status                     ---------                               -----------         ------                     CBC Auto Differential[[id number]]        Abnormal            Final result                 Please view results for these tests on the individual orders.    CBC Auto Differential [[id number]]  (Abnormal) Collected:  04/19/20 1824    Specimen:  Blood Updated:  04/19/20 1830     WBC 18.89 10*3/mm3      RBC 4.55 10*6/mm3      Hemoglobin 12.7 g/dL      Hematocrit 37.4 %      MCV 82.2 fL      MCH 27.9 pg      MCHC 34.0 g/dL      RDW 15.0 %      RDW-SD 43.2 fl      MPV 11.3 fL      Platelets 218 10*3/mm3      Neutrophil % 89.3 %      Lymphocyte % 4.6 %      Monocyte % 5.5 %      Eosinophil % 0.0 %      Basophil % 0.2 %      Immature Grans % 0.4 %      Neutrophils, Absolute 16.87 10*3/mm3      Lymphocytes, Absolute 0.87 10*3/mm3      Monocytes, Absolute 1.04 10*3/mm3      Eosinophils, Absolute 0.00 10*3/mm3      Basophils, Absolute 0.03 10*3/mm3      Immature Grans, Absolute 0.08 10*3/mm3      nRBC 0.0 /100 WBC            Imaging Results (Last 24 Hours)     Procedure Component Value Units Date/Time    CT Head Without Contrast [[id number]] Collected:  04/19/20 2034     Updated:  04/19/20 2056    Narrative:         CT Head Without Contrast    History: Altered mental status    Axial scans of the brain were obtained without intravenous  contrast.  Coronal and sagital reconstructions were preformed.    This exam was performed according to our departmental  dose-optimization program, which includes automated exposure  control, adjustment of the mA and/or kV according to patient size  and/or use of iterative reconstruction technique.    DLP: 958.40    Comparison: April  17, 2020    Findings:  Previously demonstrated 6 mm linear metallic density is as needed  table of the anterior right parietal bone.  Stable deformity high left frontal parietal bone which may be  posttraumatic or postoperative in nature.  The visualized paranasal sinuses are unremarkable.    No acute process.  Minimal cerebral atrophy.  No hemorrhage.  No mass.  No abnormal areas of increased or decreased attenuation.  No midline shift.  No abnormal extra-axial fluid collections.      Impression:       CONCLUSION:  No acute process.  Minimal cerebral atrophy.    02800    Electronically signed by:  Amauri Mcneill MD  4/19/2020 8:55 PM CDT  Workstation: [id number]    XR Chest 1 View [[id number]] Collected:  04/19/20 1826     Updated:  04/19/20 1835    Narrative:         PORTABLE CHEST    HISTORY: Altered mental status    Portable AP upright film of the chest was obtained at 6:18 PM.  COMPARISON: February 11, 2011    FINDINGS:   EKG leads.  The lungs are clear of an acute process.  The heart is not enlarged.  The pulmonary vasculature is not increased.  No pleural effusion.  No pneumothorax.  No acute osseous abnormality.  Old fracture of the left third rib.      Impression:       CONCLUSION:  No Acute Disease    [id number]    Electronically signed by:  Amauri Mcneill MD  4/19/2020 6:34 PM CDT  Workstation: [id number]            I reviewed the patient's new clinical results.    ASSESSMENT AND PLAN: This is a 54 y.o. 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 and I were unable to discuss pain goals for this hospitalization secondary to the patient's mental status on initial interview.      SENA # [id number], reviewed and consistent with patient reported medications.    Expected Length of Stay: Where: Home and When:  2-3days    I discussed the patient's findings and my recommendations with patient and primary care team.     Deandre Lopez MD is the attending on record at time of admission, He is aware of the patient's status and agrees with the above history and physical.    Kirby Workman Jr. MANGELA.  PGY3  Family Medicine Resident  [address] [place] [address]  Phone: [phone]  Fax: [phone]      This document has been electronically signed by Kirby Workman Jr, MD on 04/19/20 10:18 PM

## 2020-08-21 ENCOUNTER — APPOINTMENT (OUTPATIENT)
Dept: GENERAL RADIOLOGY | Facility: HOSPITAL | Age: 55
End: 2020-08-21

## 2020-08-21 ENCOUNTER — HOSPITAL ENCOUNTER (EMERGENCY)
Facility: HOSPITAL | Age: 55
Discharge: HOME | End: 2020-08-21
Attending: EMERGENCY MEDICINE | Admitting: EMERGENCY MEDICINE

## 2020-08-21 VITALS
RESPIRATION RATE: 18 BRPM | DIASTOLIC BLOOD PRESSURE: 57 MMHG | OXYGEN SATURATION: 94 % | SYSTOLIC BLOOD PRESSURE: 129 MMHG | HEART RATE: 84 BPM | BODY MASS INDEX: 22.73 KG/M2 | WEIGHT: 150 LBS | TEMPERATURE: 97.7 F | HEIGHT: 68 IN

## 2020-08-21 DIAGNOSIS — S22.31XA CLOSED FRACTURE OF ONE RIB OF RIGHT SIDE, INITIAL ENCOUNTER: Primary | ICD-10-CM

## 2020-08-21 PROCEDURE — 96372 THER/PROPH/DIAG INJ SC/IM: CPT

## 2020-08-21 PROCEDURE — 71101 X-RAY EXAM UNILAT RIBS/CHEST: CPT

## 2020-08-21 PROCEDURE — 99283 EMERGENCY DEPT VISIT LOW MDM: CPT

## 2020-08-21 PROCEDURE — 25010000002 KETOROLAC TROMETHAMINE PER 15 MG: Performed by: EMERGENCY MEDICINE

## 2020-08-21 RX ORDER — ACETAMINOPHEN AND CODEINE PHOSPHATE 300; 30 MG/1; MG/1
2 TABLET ORAL EVERY 6 HOURS PRN
Qty: 20 TABLET | Refills: 0 | Status: SHIPPED | OUTPATIENT
Start: 2020-08-21

## 2020-08-21 RX ORDER — KETOROLAC TROMETHAMINE 30 MG/ML
60 INJECTION, SOLUTION INTRAMUSCULAR; INTRAVENOUS ONCE
Status: COMPLETED | OUTPATIENT
Start: 2020-08-21 | End: 2020-08-21

## 2020-08-21 RX ADMIN — KETOROLAC TROMETHAMINE 60 MG: 60 INJECTION, SOLUTION INTRAMUSCULAR at 04:32

## 2020-08-21 NOTE — ED PROVIDER NOTES
Subjective   55-year-old white male arrives ambulatory to the emergency department chief complaint of rib pain.  Patient relates he was coughing at midnight and hurt his right ribs.  He denies chest pain otherwise.  He relates the pain is severe with movement and coughing.  He denies fever, sweats, chills, shortness of breath, or abdominal pain.          Review of Systems   Constitutional: Negative for chills, diaphoresis and fever.   Respiratory: Positive for cough. Negative for shortness of breath.    Cardiovascular: Positive for chest pain. Negative for leg swelling.   Gastrointestinal: Negative for abdominal pain, nausea and vomiting.   Genitourinary: Negative for dysuria and hematuria.   Musculoskeletal: Negative for back pain, gait problem and neck pain.   Neurological: Negative for syncope, weakness and headaches.   All other systems reviewed and are negative.      Past Medical History:   Diagnosis Date   • Depression    • Hyperlipidemia    • Hypertension    • Seizures (CMS/HCC)    • Tremors of nervous system        Allergies   Allergen Reactions   • Gabapentin    • Hydrocodone    • Simvastatin    • Trazodone Anxiety       Past Surgical History:   Procedure Laterality Date   • BACK SURGERY     • BRAIN SURGERY         No family history on file.    Social History     Socioeconomic History   • Marital status: Single     Spouse name: Not on file   • Number of children: Not on file   • Years of education: Not on file   • Highest education level: Not on file   Tobacco Use   • Smoking status: Current Every Day Smoker     Packs/day: 2.00     Types: Cigarettes   • Smokeless tobacco: Never Used   Substance and Sexual Activity   • Alcohol use: No   • Drug use: No   • Sexual activity: Defer   Social History Narrative    Pt lives by himself, unemployed           Objective   Physical Exam   Constitutional: He is oriented to person, place, and time. He appears well-developed and well-nourished. No distress.   HENT:   Head:  Normocephalic and atraumatic.   Right Ear: External ear normal.   Left Ear: External ear normal.   Nose: Nose normal.   Mouth/Throat: Oropharynx is clear and moist.   Eyes: Pupils are equal, round, and reactive to light. Conjunctivae and EOM are normal.   Neck: Normal range of motion. Neck supple.   Cardiovascular: Normal rate, regular rhythm, normal heart sounds and intact distal pulses.   Pulmonary/Chest: Effort normal and breath sounds normal.   Abdominal: Soft. Bowel sounds are normal. He exhibits no distension and no mass. There is no tenderness. There is no guarding.   Musculoskeletal:   Tender right posterolateral ribs.   Neurological: He is alert and oriented to person, place, and time. He exhibits normal muscle tone.   Skin: Skin is warm and dry. He is not diaphoretic.   Psychiatric: He has a normal mood and affect. His behavior is normal.   Nursing note and vitals reviewed.      Procedures           ED Course  ED Course as of Aug 21 0508   Fri Aug 21, 2020   0504 Reviewed [facility] report # [id number]    [DR]   0506 Patient is alert and resting comfortably. I reviewed the results of the emergency department evaluation with the patient.  I recommended primary care follow-up.  I advised the patient to return to the emergency department if their symptoms change or worsen.     [DR]      ED Course User Index  [DR] Ras Middleton MD               Labs Reviewed - No data to display  Xr Ribs Right With Pa Chest    Result Date: 8/21/2020  Narrative: EXAM:   XR Right Ribs and AP Chest, 3 or More Views CLINICAL HISTORY:   The patient is 55 years old and is Male; cough, right rib pain TECHNIQUE:   Frontal and oblique views of the right ribs and frontal view of the chest. COMPARISON:   Chest radiograph April 19, 2020 FINDINGS:   LUNGS:  Unremarkable.  No consolidation.   PLEURAL SPACE:  Minimal blunting of the right costophrenic angle is present.  No pneumothorax.   HEART:  Unremarkable.  No cardiomegaly.   MEDIASTINUM:   Unremarkable.   BONES/JOINTS:  Fracture of the right posterior seventh rib is present. Healed left second rib fracture is noted.     Impression: 1.  Fracture right posterior seventh rib. 2.  Findings suggestive of small right pleural effusion. Electronically signed by:  Kaya Colon MD  8/21/2020 4:56 AM CDT Workstation: [id number]                               [facility]    Final diagnoses:   Closed fracture of one rib of right side, initial encounter            Ras Middleton MD  08/21/20 6592

## 2020-08-21 NOTE — ED NOTES
Pt in mask. Instructed to wear mask at all times during hospital stay unless instructed otherwise by staff. RN in mask at all times while in pt room. Hand hygeine done at least on entry into and exit from pt room.       Lois Kuhn RN  08/21/20 8661

## 2020-08-26 NOTE — PAYOR COMM NOTE
"Amanda Mauriliongoc   [facility]  phone [phone]  fax [phone]    [facility] Care Department  ATTN ER/Transfer Out      Quoc Alamo Jr. (55 y.o. Male)     Date of Birth Social Security Number Address Home Phone MRN    1965  [address] [place] [address] [phone] [id number]    Spiritism Marital Status          [facility] Single       Admission Date Admission Type Admitting Provider Attending Provider Department, Room/Bed    8/21/20 Emergency Ras Middleton MD  [facility] Emergency Department, 15/15    Discharge Date Discharge Disposition Discharge Destination        8/21/2020 Home or Self Care              Attending Provider:  (none)   Allergies:  Gabapentin, Hydrocodone, Simvastatin, Trazodone    Isolation:  None   Infection:  None   Code Status:  Prior    Ht:  172.7 cm (68\")   Wt:  68 kg (150 lb)    Admission Cmt:  None   Principal Problem:  None                Active Insurance as of 8/21/2020     Primary Coverage     Payor Plan Insurance Group Employer/Plan Group    [facility] DE[facility] [id number]     Payor Plan Address Payor Plan Phone Number Payor Plan Fax Number Effective Dates    [facility] OFFICE OF COMMUNITY CARE [phone]  1/1/2018 - None Entered    [address] [address]       [facility] [address]       Subscriber Name Subscriber Birth Date Member ID       QUOC ALAMO JR. 1965 [id number]                 Emergency Contacts      (Rel.) Home Phone Work Phone Mobile Phone    Abraham Quiñones (Brother) [phone] -- [phone]    jovita alamo (Daughter) [phone] -- [phone]               Emergency Department Notes      Lois Kuhn RN at 08/21/20 0420        Pt in mask. Instructed to wear mask at all times during hospital stay unless instructed otherwise by staff. RN in mask at all times while in pt room. Hand hygeine done at least on entry into and exit from pt room.       Lois Kuhn " ROSIE HAYWOOD  08/21/20 0420      Electronically signed by Lois Kuhn RN at 08/21/20 0420     Ras Middleton MD at 08/21/20 0421          Subjective   55-year-old white male arrives ambulatory to the emergency department chief complaint of rib pain.  Patient relates he was coughing at midnight and hurt his right ribs.  He denies chest pain otherwise.  He relates the pain is severe with movement and coughing.  He denies fever, sweats, chills, shortness of breath, or abdominal pain.          Review of Systems   Constitutional: Negative for chills, diaphoresis and fever.   Respiratory: Positive for cough. Negative for shortness of breath.    Cardiovascular: Positive for chest pain. Negative for leg swelling.   Gastrointestinal: Negative for abdominal pain, nausea and vomiting.   Genitourinary: Negative for dysuria and hematuria.   Musculoskeletal: Negative for back pain, gait problem and neck pain.   Neurological: Negative for syncope, weakness and headaches.   All other systems reviewed and are negative.      Past Medical History:   Diagnosis Date   • Depression    • Hyperlipidemia    • Hypertension    • Seizures (CMS/HCC)    • Tremors of nervous system        Allergies   Allergen Reactions   • Gabapentin    • Hydrocodone    • Simvastatin    • Trazodone Anxiety       Past Surgical History:   Procedure Laterality Date   • BACK SURGERY     • BRAIN SURGERY         No family history on file.    Social History     Socioeconomic History   • Marital status: Single     Spouse name: Not on file   • Number of children: Not on file   • Years of education: Not on file   • Highest education level: Not on file   Tobacco Use   • Smoking status: Current Every Day Smoker     Packs/day: 2.00     Types: Cigarettes   • Smokeless tobacco: Never Used   Substance and Sexual Activity   • Alcohol use: No   • Drug use: No   • Sexual activity: Defer   Social History Narrative    Pt lives by himself, unemployed           Objective   Physical  Exam   Constitutional: He is oriented to person, place, and time. He appears well-developed and well-nourished. No distress.   HENT:   Head: Normocephalic and atraumatic.   Right Ear: External ear normal.   Left Ear: External ear normal.   Nose: Nose normal.   Mouth/Throat: Oropharynx is clear and moist.   Eyes: Pupils are equal, round, and reactive to light. Conjunctivae and EOM are normal.   Neck: Normal range of motion. Neck supple.   Cardiovascular: Normal rate, regular rhythm, normal heart sounds and intact distal pulses.   Pulmonary/Chest: Effort normal and breath sounds normal.   Abdominal: Soft. Bowel sounds are normal. He exhibits no distension and no mass. There is no tenderness. There is no guarding.   Musculoskeletal:   Tender right posterolateral ribs.   Neurological: He is alert and oriented to person, place, and time. He exhibits normal muscle tone.   Skin: Skin is warm and dry. He is not diaphoretic.   Psychiatric: He has a normal mood and affect. His behavior is normal.   Nursing note and vitals reviewed.      Procedures          ED Course  ED Course as of Aug 21 0508   Fri Aug 21, 2020   0504 Reviewed [employer] report # [id number]    [DR]   0506 Patient is alert and resting comfortably. I reviewed the results of the emergency department evaluation with the patient.  I recommended primary care follow-up.  I advised the patient to return to the emergency department if their symptoms change or worsen.     [DR]      ED Course User Index  [DR] Ras Middleton MD               Labs Reviewed - No data to display  Xr Ribs Right With Pa Chest    Result Date: 8/21/2020  Narrative: EXAM:   XR Right Ribs and AP Chest, 3 or More Views CLINICAL HISTORY:   The patient is 55 years old and is Male; cough, right rib pain TECHNIQUE:   Frontal and oblique views of the right ribs and frontal view of the chest. COMPARISON:   Chest radiograph April 19, 2020 FINDINGS:   LUNGS:  Unremarkable.  No consolidation.   PLEURAL SPACE:   Minimal blunting of the right costophrenic angle is present.  No pneumothorax.   HEART:  Unremarkable.  No cardiomegaly.   MEDIASTINUM:  Unremarkable.   BONES/JOINTS:  Fracture of the right posterior seventh rib is present. Healed left second rib fracture is noted.     Impression: 1.  Fracture right posterior seventh rib. 2.  Findings suggestive of small right pleural effusion. Electronically signed by:  Kaya Colon MD  8/21/2020 4:56 AM CDT Workstation: [id number]                               [facility]    Final diagnoses:   Closed fracture of one rib of right side, initial encounter            Ras Middleton MD  08/21/20 3237      Electronically signed by Ras Middleton MD at 08/21/20 5859

## 2021-02-14 ENCOUNTER — HOSPITAL ENCOUNTER (EMERGENCY)
Facility: HOSPITAL | Age: 56
Discharge: HOME | End: 2021-02-14
Attending: STUDENT IN AN ORGANIZED HEALTH CARE EDUCATION/TRAINING PROGRAM | Admitting: STUDENT IN AN ORGANIZED HEALTH CARE EDUCATION/TRAINING PROGRAM

## 2021-02-14 VITALS
DIASTOLIC BLOOD PRESSURE: 113 MMHG | HEIGHT: 68 IN | OXYGEN SATURATION: 99 % | WEIGHT: 167 LBS | BODY MASS INDEX: 25.31 KG/M2 | HEART RATE: 100 BPM | RESPIRATION RATE: 18 BRPM | TEMPERATURE: 98.7 F | SYSTOLIC BLOOD PRESSURE: 179 MMHG

## 2021-02-14 DIAGNOSIS — R25.1 TREMOR: Primary | ICD-10-CM

## 2021-02-14 LAB
ANION GAP SERPL CALCULATED.3IONS-SCNC: 9 MMOL/L (ref 5–15)
BASOPHILS # BLD AUTO: 0.12 10*3/MM3 (ref 0–0.2)
BASOPHILS NFR BLD AUTO: 1.1 % (ref 0–1.5)
BUN SERPL-MCNC: 6 MG/DL (ref 6–20)
BUN/CREAT SERPL: 7.2 (ref 7–25)
CALCIUM SPEC-SCNC: 9.3 MG/DL (ref 8.6–10.5)
CHLORIDE SERPL-SCNC: 100 MMOL/L (ref 98–107)
CO2 SERPL-SCNC: 32 MMOL/L (ref 22–29)
CREAT SERPL-MCNC: 0.83 MG/DL (ref 0.76–1.27)
DEPRECATED RDW RBC AUTO: 47.2 FL (ref 37–54)
EOSINOPHIL # BLD AUTO: 0.39 10*3/MM3 (ref 0–0.4)
EOSINOPHIL NFR BLD AUTO: 3.6 % (ref 0.3–6.2)
ERYTHROCYTE [DISTWIDTH] IN BLOOD BY AUTOMATED COUNT: 13.8 % (ref 12.3–15.4)
ETHANOL BLD-MCNC: <10 MG/DL (ref 0–10)
ETHANOL UR QL: <0.01 %
GFR SERPL CREATININE-BSD FRML MDRD: 96 ML/MIN/1.73
GLUCOSE SERPL-MCNC: 112 MG/DL (ref 65–99)
HCT VFR BLD AUTO: 53.1 % (ref 37.5–51)
HGB BLD-MCNC: 18.8 G/DL (ref 13–17.7)
HOLD SPECIMEN: NORMAL
IMM GRANULOCYTES # BLD AUTO: 0.03 10*3/MM3 (ref 0–0.05)
IMM GRANULOCYTES NFR BLD AUTO: 0.3 % (ref 0–0.5)
LYMPHOCYTES # BLD AUTO: 2.98 10*3/MM3 (ref 0.7–3.1)
LYMPHOCYTES NFR BLD AUTO: 27.3 % (ref 19.6–45.3)
MAGNESIUM SERPL-MCNC: 1.9 MG/DL (ref 1.6–2.6)
MCH RBC QN AUTO: 32.5 PG (ref 26.6–33)
MCHC RBC AUTO-ENTMCNC: 35.4 G/DL (ref 31.5–35.7)
MCV RBC AUTO: 91.7 FL (ref 79–97)
MONOCYTES # BLD AUTO: 0.73 10*3/MM3 (ref 0.1–0.9)
MONOCYTES NFR BLD AUTO: 6.7 % (ref 5–12)
NEUTROPHILS NFR BLD AUTO: 6.65 10*3/MM3 (ref 1.7–7)
NEUTROPHILS NFR BLD AUTO: 61 % (ref 42.7–76)
NRBC BLD AUTO-RTO: 0 /100 WBC (ref 0–0.2)
PLATELET # BLD AUTO: 288 10*3/MM3 (ref 140–450)
PMV BLD AUTO: 10 FL (ref 6–12)
POTASSIUM SERPL-SCNC: 3.4 MMOL/L (ref 3.5–5.2)
RBC # BLD AUTO: 5.79 10*6/MM3 (ref 4.14–5.8)
SODIUM SERPL-SCNC: 141 MMOL/L (ref 136–145)
WBC # BLD AUTO: 10.9 10*3/MM3 (ref 3.4–10.8)
WHOLE BLOOD HOLD SPECIMEN: NORMAL

## 2021-02-14 PROCEDURE — 82077 ASSAY SPEC XCP UR&BREATH IA: CPT | Performed by: STUDENT IN AN ORGANIZED HEALTH CARE EDUCATION/TRAINING PROGRAM

## 2021-02-14 PROCEDURE — 85025 COMPLETE CBC W/AUTO DIFF WBC: CPT | Performed by: STUDENT IN AN ORGANIZED HEALTH CARE EDUCATION/TRAINING PROGRAM

## 2021-02-14 PROCEDURE — 99283 EMERGENCY DEPT VISIT LOW MDM: CPT

## 2021-02-14 PROCEDURE — 83735 ASSAY OF MAGNESIUM: CPT | Performed by: STUDENT IN AN ORGANIZED HEALTH CARE EDUCATION/TRAINING PROGRAM

## 2021-02-14 PROCEDURE — 80048 BASIC METABOLIC PNL TOTAL CA: CPT | Performed by: STUDENT IN AN ORGANIZED HEALTH CARE EDUCATION/TRAINING PROGRAM

## 2021-02-14 RX ORDER — AMANTADINE HYDROCHLORIDE 100 MG/1
100 CAPSULE, GELATIN COATED ORAL DAILY
Qty: 4 CAPSULE | Refills: 0 | Status: SHIPPED | OUTPATIENT
Start: 2021-02-14 | End: 2021-02-19 | Stop reason: SDUPTHER

## 2021-02-14 NOTE — ED TRIAGE NOTES
Pt comes in today with tremors. [facility] and is unable to get it filled until March. Denies any other problems.     He has visible tremors that seem to be greater in BUE. Hey wax and wane in intensity. He is alert and oriented x4

## 2021-02-14 NOTE — ED PROVIDER NOTES
Subjective   55-year-old male comes to the ER with tremors.  He has had tremors for multiple years.  He takes medication for.  He ran out of one of his medicines about a week ago and his tremors have been getting worse since then.  [facility] and they told him they cannot fill it until March.  Patient is here to get a refill of his medication.  No weakness or numbness.  No confusion, nausea, vomiting, headache.      History provided by:  Patient   used: No        Review of Systems   Constitutional: Negative for chills and fever.   HENT: Negative for congestion and rhinorrhea.    Respiratory: Negative for cough and shortness of breath.    Cardiovascular: Negative for chest pain and palpitations.   Gastrointestinal: Negative for abdominal pain and nausea.   Genitourinary: Negative for dysuria and flank pain.   Skin: Negative for color change and rash.   Neurological: Positive for tremors. Negative for dizziness, syncope, weakness, light-headedness, numbness and headaches.   Psychiatric/Behavioral: Negative for agitation. The patient is not nervous/anxious.        Past Medical History:   Diagnosis Date   • Depression    • Hyperlipidemia    • Hypertension    • Seizures (CMS/HCC)    • Tremors of nervous system        Allergies   Allergen Reactions   • Gabapentin    • Hydrocodone    • Simvastatin    • Trazodone Anxiety       Past Surgical History:   Procedure Laterality Date   • BACK SURGERY     • BRAIN SURGERY         No family history on file.    Social History     Socioeconomic History   • Marital status:      Spouse name: Not on file   • Number of children: Not on file   • Years of education: Not on file   • Highest education level: Not on file   Tobacco Use   • Smoking status: Current Every Day Smoker     Packs/day: 2.00     Types: Cigarettes   • Smokeless tobacco: Never Used   Substance and Sexual Activity   • Alcohol use: No   • Drug use: No   •  "Sexual activity: Defer   Social History Narrative    Pt lives by himself, unemployed           Objective    Vitals:    02/14/21 1323 02/14/21 1326 02/14/21 1417 02/14/21 1701   BP:  (!) 213/115  Comment: b/p taken twice pt unable to remain still while bp was talking (!) 205/104 (!) 179/113   Pulse: 108  104 100   Resp: 18      Temp: 98.7 °F (37.1 °C)      TempSrc: Skin      SpO2: 98%  99%    Weight: 75.8 kg (167 lb)      Height: 172.7 cm (68\")          Physical Exam  Vitals signs and nursing note reviewed.   Constitutional:       General: He is not in acute distress.     Appearance: He is well-developed. He is not diaphoretic.   HENT:      Head: Normocephalic.      Right Ear: External ear normal.      Left Ear: External ear normal.      Nose: No rhinorrhea.   Eyes:      Extraocular Movements: Extraocular movements intact.      Conjunctiva/sclera: Conjunctivae normal.      Pupils: Pupils are equal, round, and reactive to light.   Neck:      Musculoskeletal: Normal range of motion.      Trachea: Trachea normal.   Cardiovascular:      Rate and Rhythm: Normal rate.   Pulmonary:      Effort: Pulmonary effort is normal. No accessory muscle usage or respiratory distress.   Skin:     General: Skin is warm and dry.      Capillary Refill: Capillary refill takes less than 2 seconds.   Neurological:      Mental Status: He is alert and oriented to person, place, and time.      GCS: GCS eye subscore is 4. GCS verbal subscore is 5. GCS motor subscore is 6.      Sensory: No sensory deficit.      Motor: Tremor present. No weakness.   Psychiatric:         Behavior: Behavior normal.         Procedures           ED Course      Results for orders placed or performed during the hospital encounter of 02/14/21   Basic Metabolic Panel    Specimen: Blood   Result Value Ref Range    Glucose 112 (H) 65 - 99 mg/dL    BUN 6 6 - 20 mg/dL    Creatinine 0.83 0.76 - 1.27 mg/dL    Sodium 141 136 - 145 mmol/L    Potassium 3.4 (L) 3.5 - 5.2 mmol/L    " Chloride 100 98 - 107 mmol/L    CO2 32.0 (H) 22.0 - 29.0 mmol/L    Calcium 9.3 8.6 - 10.5 mg/dL    eGFR Non African Amer 96 >60 mL/min/1.73    BUN/Creatinine Ratio 7.2 7.0 - 25.0    Anion Gap 9.0 5.0 - 15.0 mmol/L   Magnesium    Specimen: Blood   Result Value Ref Range    Magnesium 1.9 1.6 - 2.6 mg/dL   Ethanol    Specimen: Blood   Result Value Ref Range    Ethanol <10 0 - 10 mg/dL    Ethanol % <0.010 %   CBC Auto Differential    Specimen: Blood   Result Value Ref Range    WBC 10.90 (H) 3.40 - 10.80 10*3/mm3    RBC 5.79 4.14 - 5.80 10*6/mm3    Hemoglobin 18.8 (H) 13.0 - 17.7 g/dL    Hematocrit 53.1 (H) 37.5 - 51.0 %    MCV 91.7 79.0 - 97.0 fL    MCH 32.5 26.6 - 33.0 pg    MCHC 35.4 31.5 - 35.7 g/dL    RDW 13.8 12.3 - 15.4 %    RDW-SD 47.2 37.0 - 54.0 fl    MPV 10.0 6.0 - 12.0 fL    Platelets 288 140 - 450 10*3/mm3    Neutrophil % 61.0 42.7 - 76.0 %    Lymphocyte % 27.3 19.6 - 45.3 %    Monocyte % 6.7 5.0 - 12.0 %    Eosinophil % 3.6 0.3 - 6.2 %    Basophil % 1.1 0.0 - 1.5 %    Immature Grans % 0.3 0.0 - 0.5 %    Neutrophils, Absolute 6.65 1.70 - 7.00 10*3/mm3    Lymphocytes, Absolute 2.98 0.70 - 3.10 10*3/mm3    Monocytes, Absolute 0.73 0.10 - 0.90 10*3/mm3    Eosinophils, Absolute 0.39 0.00 - 0.40 10*3/mm3    Basophils, Absolute 0.12 0.00 - 0.20 10*3/mm3    Immature Grans, Absolute 0.03 0.00 - 0.05 10*3/mm3    nRBC 0.0 0.0 - 0.2 /100 WBC   Light Blue Top   Result Value Ref Range    Extra Tube hold for add-on    Gold Top - SST   Result Value Ref Range    Extra Tube Hold for add-ons.            MDM  Number of Diagnoses or Management Options  Tremor: new and requires workup  Diagnosis management comments: Vital signs are stable, afebrile.  Neurovascular intact.  Labs are unremarkable.  Confirmed with the patient's pharmacy that he takes amantadine 100 mg daily.  On reevaluation, patient is alert and resting comfortably. I discussed the results of the emergency department evaluation.  I recommended primary care  follow-up.  Return precautions discussed.  Prescribed several days worth of amantadine.  Patient has not taken his blood pressure medicine today.       Amount and/or Complexity of Data Reviewed  Clinical lab tests: ordered and reviewed  Tests in the medicine section of CPT®: ordered and reviewed  Decide to obtain previous medical records or to obtain history from someone other than the patient: yes  Review and summarize past medical records: yes  Discuss the patient with other providers: yes    Patient Progress  Patient progress: stable      Final diagnoses:   Tremor            Matthew Park MD  02/14/21 1953

## 2021-02-17 NOTE — PAYOR COMM NOTE
"Amanda Thorpengoc   [facility]  phone [phone]  fax [phone]    Community Care Dept  ATTN ER/Transfer out      Quoc Alamo Jr. (55 y.o. Male)     Date of Birth Social Security Number Address Home Phone MRN    1965  [address] [place] [address] [phone] [id number]    [employer] Marital Status          Rastafari        Admission Date Admission Type Admitting Provider Attending Provider Department, Room/Bed    2/14/21 Emergency   [facility] Emergency Department, HARRIET/HARRIET    Discharge Date Discharge Disposition Discharge Destination        2/14/2021 Home or Self Care              Attending Provider: (none)   Allergies: Gabapentin, Hydrocodone, Simvastatin, Trazodone    Isolation: None   Infection: None   Code Status: Prior    Ht: 172.7 cm (68\")   Wt: 75.8 kg (167 lb)    Admission Cmt: None   Principal Problem: None                Active Insurance as of 2/14/2021     Primary Coverage     Payor Plan Insurance Group Employer/Plan Group    [facility] [id number]     Payor Plan Address Payor Plan Phone Number Payor Plan Fax Number Effective Dates    [facility] OFFICE OF COMMUNITY CARE [phone]  1/1/2018 - None Entered    [address] [address]       [facility] [address]       Subscriber Name Subscriber Birth Date Member ID       QUOC ALAMO JR. 1965 [id number]                 Emergency Contacts      (Rel.) Home Phone Work Phone Mobile Phone    NuriaAbraham (Brother) [phone] -- [phone]    jovita alamo (Daughter) [phone] -- [phone]            History & Physical    No notes of this type exist for this encounter.            Emergency Department Notes      Khadijah Alba, RN at 02/14/21 1608        Pt comes in today with tremors. [facility]atoin and is unable to get it filled until March. Denies any other problems.     He has " visible tremors that seem to be greater in BUE. Hey wax and wane in intensity. He is alert and oriented x4     Electronically signed by Khadijah Alba RN at 02/14/21 1601     Matthew Park MD at 02/14/21 1612          Subjective   55-year-old male comes to the ER with tremors.  He has had tremors for multiple years.  He takes medication for.  He ran out of one of his medicines about a week ago and his tremors have been getting worse since then.  [facility] and they told him they cannot fill it until March.  Patient is here to get a refill of his medication.  No weakness or numbness.  No confusion, nausea, vomiting, headache.      History provided by:  Patient   used: No        Review of Systems   Constitutional: Negative for chills and fever.   HENT: Negative for congestion and rhinorrhea.    Respiratory: Negative for cough and shortness of breath.    Cardiovascular: Negative for chest pain and palpitations.   Gastrointestinal: Negative for abdominal pain and nausea.   Genitourinary: Negative for dysuria and flank pain.   Skin: Negative for color change and rash.   Neurological: Positive for tremors. Negative for dizziness, syncope, weakness, light-headedness, numbness and headaches.   Psychiatric/Behavioral: Negative for agitation. The patient is not nervous/anxious.        Past Medical History:   Diagnosis Date   • Depression    • Hyperlipidemia    • Hypertension    • Seizures (CMS/HCC)    • Tremors of nervous system        Allergies   Allergen Reactions   • Gabapentin    • Hydrocodone    • Simvastatin    • Trazodone Anxiety       Past Surgical History:   Procedure Laterality Date   • BACK SURGERY     • BRAIN SURGERY         No family history on file.    Social History     Socioeconomic History   • Marital status:      Spouse name: Not on file   • Number of children: Not on file   • Years of education: Not on file   • Highest education  "level: Not on file   Tobacco Use   • Smoking status: Current Every Day Smoker     Packs/day: 2.00     Types: Cigarettes   • Smokeless tobacco: Never Used   Substance and Sexual Activity   • Alcohol use: No   • Drug use: No   • Sexual activity: Defer   Social History Narrative    Pt lives by himself, unemployed           Objective    Vitals:    02/14/21 1323 02/14/21 1326 02/14/21 1417 02/14/21 1701   BP:  (!) 213/115  Comment: b/p taken twice pt unable to remain still while bp was talking (!) 205/104 (!) 179/113   Pulse: 108  104 100   Resp: 18      Temp: 98.7 °F (37.1 °C)      TempSrc: Skin      SpO2: 98%  99%    Weight: 75.8 kg (167 lb)      Height: 172.7 cm (68\")          Physical Exam  Vitals signs and nursing note reviewed.   Constitutional:       General: He is not in acute distress.     Appearance: He is well-developed. He is not diaphoretic.   HENT:      Head: Normocephalic.      Right Ear: External ear normal.      Left Ear: External ear normal.      Nose: No rhinorrhea.   Eyes:      Extraocular Movements: Extraocular movements intact.      Conjunctiva/sclera: Conjunctivae normal.      Pupils: Pupils are equal, round, and reactive to light.   Neck:      Musculoskeletal: Normal range of motion.      Trachea: Trachea normal.   Cardiovascular:      Rate and Rhythm: Normal rate.   Pulmonary:      Effort: Pulmonary effort is normal. No accessory muscle usage or respiratory distress.   Skin:     General: Skin is warm and dry.      Capillary Refill: Capillary refill takes less than 2 seconds.   Neurological:      Mental Status: He is alert and oriented to person, place, and time.      GCS: GCS eye subscore is 4. GCS verbal subscore is 5. GCS motor subscore is 6.      Sensory: No sensory deficit.      Motor: Tremor present. No weakness.   Psychiatric:         Behavior: Behavior normal.         Procedures          ED Course      Results for orders placed or performed during the hospital encounter of 02/14/21   Basic " Metabolic Panel    Specimen: Blood   Result Value Ref Range    Glucose 112 (H) 65 - 99 mg/dL    BUN 6 6 - 20 mg/dL    Creatinine 0.83 0.76 - 1.27 mg/dL    Sodium 141 136 - 145 mmol/L    Potassium 3.4 (L) 3.5 - 5.2 mmol/L    Chloride 100 98 - 107 mmol/L    CO2 32.0 (H) 22.0 - 29.0 mmol/L    Calcium 9.3 8.6 - 10.5 mg/dL    eGFR Non African Amer 96 >60 mL/min/1.73    BUN/Creatinine Ratio 7.2 7.0 - 25.0    Anion Gap 9.0 5.0 - 15.0 mmol/L   Magnesium    Specimen: Blood   Result Value Ref Range    Magnesium 1.9 1.6 - 2.6 mg/dL   Ethanol    Specimen: Blood   Result Value Ref Range    Ethanol <10 0 - 10 mg/dL    Ethanol % <0.010 %   CBC Auto Differential    Specimen: Blood   Result Value Ref Range    WBC 10.90 (H) 3.40 - 10.80 10*3/mm3    RBC 5.79 4.14 - 5.80 10*6/mm3    Hemoglobin 18.8 (H) 13.0 - 17.7 g/dL    Hematocrit 53.1 (H) 37.5 - 51.0 %    MCV 91.7 79.0 - 97.0 fL    MCH 32.5 26.6 - 33.0 pg    MCHC 35.4 31.5 - 35.7 g/dL    RDW 13.8 12.3 - 15.4 %    RDW-SD 47.2 37.0 - 54.0 fl    MPV 10.0 6.0 - 12.0 fL    Platelets 288 140 - 450 10*3/mm3    Neutrophil % 61.0 42.7 - 76.0 %    Lymphocyte % 27.3 19.6 - 45.3 %    Monocyte % 6.7 5.0 - 12.0 %    Eosinophil % 3.6 0.3 - 6.2 %    Basophil % 1.1 0.0 - 1.5 %    Immature Grans % 0.3 0.0 - 0.5 %    Neutrophils, Absolute 6.65 1.70 - 7.00 10*3/mm3    Lymphocytes, Absolute 2.98 0.70 - 3.10 10*3/mm3    Monocytes, Absolute 0.73 0.10 - 0.90 10*3/mm3    Eosinophils, Absolute 0.39 0.00 - 0.40 10*3/mm3    Basophils, Absolute 0.12 0.00 - 0.20 10*3/mm3    Immature Grans, Absolute 0.03 0.00 - 0.05 10*3/mm3    nRBC 0.0 0.0 - 0.2 /100 WBC   Light Blue Top   Result Value Ref Range    Extra Tube hold for add-on    Gold Top - SST   Result Value Ref Range    Extra Tube Hold for add-ons.            MDM  Number of Diagnoses or Management Options  Tremor: new and requires workup  Diagnosis management comments: Vital signs are stable, afebrile.  Neurovascular intact.  Labs are unremarkable.  Confirmed  with the patient's pharmacy that he takes amantadine 100 mg daily.  On reevaluation, patient is alert and resting comfortably. I discussed the results of the emergency department evaluation.  I recommended primary care follow-up.  Return precautions discussed.  Prescribed several days worth of amantadine.  Patient has not taken his blood pressure medicine today.       Amount and/or Complexity of Data Reviewed  Clinical lab tests: ordered and reviewed  Tests in the medicine section of CPT®: ordered and reviewed  Decide to obtain previous medical records or to obtain history from someone other than the patient: yes  Review and summarize past medical records: yes  Discuss the patient with other providers: yes    Patient Progress  Patient progress: stable      Final diagnoses:   Tremor            Matthew Park MD  02/14/21 1953      Electronically signed by Matthew Park MD at 02/14/21 1953       Discharge Summary    No notes of this type exist for this encounter.         Discharge Order (From admission, onward)    None

## 2021-02-19 ENCOUNTER — HOSPITAL ENCOUNTER (EMERGENCY)
Facility: HOSPITAL | Age: 56
Discharge: HOME | End: 2021-02-20
Attending: EMERGENCY MEDICINE | Admitting: EMERGENCY MEDICINE

## 2021-02-19 VITALS
OXYGEN SATURATION: 94 % | DIASTOLIC BLOOD PRESSURE: 105 MMHG | HEIGHT: 68 IN | RESPIRATION RATE: 18 BRPM | BODY MASS INDEX: 25.31 KG/M2 | TEMPERATURE: 98.4 F | WEIGHT: 167 LBS | SYSTOLIC BLOOD PRESSURE: 187 MMHG | HEART RATE: 97 BPM

## 2021-02-19 DIAGNOSIS — Z76.0 MEDICATION REFILL: Primary | ICD-10-CM

## 2021-02-19 PROCEDURE — 99283 EMERGENCY DEPT VISIT LOW MDM: CPT

## 2021-02-19 RX ORDER — AMANTADINE HYDROCHLORIDE 100 MG/1
100 CAPSULE, GELATIN COATED ORAL ONCE
Status: COMPLETED | OUTPATIENT
Start: 2021-02-19 | End: 2021-02-19

## 2021-02-19 RX ORDER — AMANTADINE HYDROCHLORIDE 100 MG/1
100 CAPSULE, GELATIN COATED ORAL DAILY
Qty: 1 CAPSULE | Refills: 0 | Status: SHIPPED | OUTPATIENT
Start: 2021-02-19 | End: 2021-11-16 | Stop reason: SDUPTHER

## 2021-02-19 RX ADMIN — AMANTADINE HYDROCHLORIDE 100 MG: 100 CAPSULE ORAL at 23:58

## 2021-02-20 NOTE — ED PROVIDER NOTES
Subjective   55-year-old male presents the emergency department with medication refill request.  He reportedly has tremors that have been treated with amantadine.  He ran out of this medication [facility].  He was seen here 5 days ago and got a prescription for 4 days.  He thought he would have his refill by now.  He has been out of his medicine since Thursday and is having significant tremors.  Denies any other complaints.  Is just requesting a refill.    Family history, surgical history, social history, current medications and allergies are reviewed with the patient and triage documentation and vitals are reviewed.      History provided by:  Patient, medical records and relative   used: No        Review of Systems   Constitutional: Negative for chills and fever.   HENT: Negative for congestion and sore throat.    Eyes: Negative for photophobia and visual disturbance.   Respiratory: Negative for cough, shortness of breath and wheezing.    Cardiovascular: Negative for chest pain, palpitations and leg swelling.   Gastrointestinal: Negative for abdominal pain, constipation, diarrhea, nausea and vomiting.   Endocrine: Negative for polydipsia, polyphagia and polyuria.   Genitourinary: Negative for dysuria, frequency and urgency.   Musculoskeletal: Negative for arthralgias, back pain, myalgias and neck pain.   Skin: Negative for color change, pallor, rash and wound.   Allergic/Immunologic: Negative.    Neurological: Positive for tremors. Negative for facial asymmetry, weakness, light-headedness, numbness and headaches.   Hematological: Negative.    Psychiatric/Behavioral: Negative.        Past Medical History:   Diagnosis Date   • Depression    • Hyperlipidemia    • Hypertension    • Seizures (CMS/HCC)    • Tremors of nervous system        Allergies   Allergen Reactions   • Gabapentin    • Hydrocodone    • Simvastatin    • Trazodone Anxiety       Past  Surgical History:   Procedure Laterality Date   • BACK SURGERY     • BRAIN SURGERY         History reviewed. No pertinent family history.    Social History     Socioeconomic History   • Marital status:      Spouse name: Not on file   • Number of children: Not on file   • Years of education: Not on file   • Highest education level: Not on file   Tobacco Use   • Smoking status: Current Every Day Smoker     Packs/day: 2.00     Types: Cigarettes   • Smokeless tobacco: Never Used   Substance and Sexual Activity   • Alcohol use: No   • Drug use: No   • Sexual activity: Defer   Social History Narrative    Pt lives by himself, unemployed           Objective   Physical Exam  Vitals signs and nursing note reviewed.   Constitutional:       Appearance: Normal appearance.   Cardiovascular:      Rate and Rhythm: Normal rate and regular rhythm.   Pulmonary:      Effort: Pulmonary effort is normal.      Breath sounds: Normal breath sounds.   Skin:     General: Skin is warm and dry.      Capillary Refill: Capillary refill takes less than 2 seconds.   Neurological:      General: No focal deficit present.      Mental Status: He is alert.      GCS: GCS eye subscore is 4. GCS verbal subscore is 5. GCS motor subscore is 6.      Motor: Tremor (bilateral hands) present.         Procedures  none         ED Course    Labs Reviewed - No data to display  No results found.          MDM  Number of Diagnoses or Management Options  Medication refill:   Patient Progress  Patient progress: stable    Patient given dose in the emergency department and a prescription for 7 days.    Final diagnoses:   Medication refill            Cristino Vázquez,   02/20/21 0540

## 2021-11-16 ENCOUNTER — HOSPITAL ENCOUNTER (EMERGENCY)
Facility: HOSPITAL | Age: 56
Discharge: HOME | End: 2021-11-16
Attending: FAMILY MEDICINE | Admitting: FAMILY MEDICINE

## 2021-11-16 VITALS
TEMPERATURE: 98 F | OXYGEN SATURATION: 93 % | WEIGHT: 190 LBS | HEIGHT: 68 IN | HEART RATE: 84 BPM | BODY MASS INDEX: 28.79 KG/M2 | RESPIRATION RATE: 18 BRPM | SYSTOLIC BLOOD PRESSURE: 184 MMHG | DIASTOLIC BLOOD PRESSURE: 92 MMHG

## 2021-11-16 DIAGNOSIS — R25.1 PILL ROLLING TREMORS: Primary | ICD-10-CM

## 2021-11-16 DIAGNOSIS — E87.6 HYPOKALEMIA: ICD-10-CM

## 2021-11-16 LAB
ALBUMIN SERPL-MCNC: 5 G/DL (ref 3.5–5.2)
ALBUMIN/GLOB SERPL: 1.5 G/DL
ALP SERPL-CCNC: 102 U/L (ref 39–117)
ALT SERPL W P-5'-P-CCNC: 25 U/L (ref 1–41)
ANION GAP SERPL CALCULATED.3IONS-SCNC: 10 MMOL/L (ref 5–15)
AST SERPL-CCNC: 22 U/L (ref 1–40)
BASOPHILS # BLD AUTO: 0.14 10*3/MM3 (ref 0–0.2)
BASOPHILS NFR BLD AUTO: 1.3 % (ref 0–1.5)
BILIRUB SERPL-MCNC: 0.6 MG/DL (ref 0–1.2)
BUN SERPL-MCNC: 6 MG/DL (ref 6–20)
BUN/CREAT SERPL: 5.8 (ref 7–25)
CALCIUM SPEC-SCNC: 9.5 MG/DL (ref 8.6–10.5)
CHLORIDE SERPL-SCNC: 99 MMOL/L (ref 98–107)
CO2 SERPL-SCNC: 31 MMOL/L (ref 22–29)
CREAT SERPL-MCNC: 1.03 MG/DL (ref 0.76–1.27)
DEPRECATED RDW RBC AUTO: 40.8 FL (ref 37–54)
EOSINOPHIL # BLD AUTO: 0.71 10*3/MM3 (ref 0–0.4)
EOSINOPHIL NFR BLD AUTO: 6.6 % (ref 0.3–6.2)
ERYTHROCYTE [DISTWIDTH] IN BLOOD BY AUTOMATED COUNT: 12.3 % (ref 12.3–15.4)
GFR SERPL CREATININE-BSD FRML MDRD: 75 ML/MIN/1.73
GLOBULIN UR ELPH-MCNC: 3.3 GM/DL
GLUCOSE SERPL-MCNC: 124 MG/DL (ref 65–99)
HCT VFR BLD AUTO: 48.9 % (ref 37.5–51)
HGB BLD-MCNC: 17.5 G/DL (ref 13–17.7)
HOLD SPECIMEN: NORMAL
IMM GRANULOCYTES # BLD AUTO: 0.04 10*3/MM3 (ref 0–0.05)
IMM GRANULOCYTES NFR BLD AUTO: 0.4 % (ref 0–0.5)
LYMPHOCYTES # BLD AUTO: 2.35 10*3/MM3 (ref 0.7–3.1)
LYMPHOCYTES NFR BLD AUTO: 21.9 % (ref 19.6–45.3)
MAGNESIUM SERPL-MCNC: 2.1 MG/DL (ref 1.6–2.6)
MCH RBC QN AUTO: 32.6 PG (ref 26.6–33)
MCHC RBC AUTO-ENTMCNC: 35.8 G/DL (ref 31.5–35.7)
MCV RBC AUTO: 91.2 FL (ref 79–97)
MONOCYTES # BLD AUTO: 0.81 10*3/MM3 (ref 0.1–0.9)
MONOCYTES NFR BLD AUTO: 7.5 % (ref 5–12)
NEUTROPHILS NFR BLD AUTO: 6.69 10*3/MM3 (ref 1.7–7)
NEUTROPHILS NFR BLD AUTO: 62.3 % (ref 42.7–76)
NRBC BLD AUTO-RTO: 0 /100 WBC (ref 0–0.2)
PLATELET # BLD AUTO: 270 10*3/MM3 (ref 140–450)
PMV BLD AUTO: 10.1 FL (ref 6–12)
POTASSIUM SERPL-SCNC: 2.9 MMOL/L (ref 3.5–5.2)
PROT SERPL-MCNC: 8.3 G/DL (ref 6–8.5)
RBC # BLD AUTO: 5.36 10*6/MM3 (ref 4.14–5.8)
SODIUM SERPL-SCNC: 140 MMOL/L (ref 136–145)
WBC # BLD AUTO: 10.74 10*3/MM3 (ref 3.4–10.8)

## 2021-11-16 PROCEDURE — 85025 COMPLETE CBC W/AUTO DIFF WBC: CPT | Performed by: FAMILY MEDICINE

## 2021-11-16 PROCEDURE — 83735 ASSAY OF MAGNESIUM: CPT | Performed by: FAMILY MEDICINE

## 2021-11-16 PROCEDURE — 96365 THER/PROPH/DIAG IV INF INIT: CPT

## 2021-11-16 PROCEDURE — 99283 EMERGENCY DEPT VISIT LOW MDM: CPT

## 2021-11-16 PROCEDURE — 80053 COMPREHEN METABOLIC PANEL: CPT | Performed by: FAMILY MEDICINE

## 2021-11-16 PROCEDURE — 25010000002 MAGNESIUM SULFATE 2 GM/50ML SOLUTION: Performed by: FAMILY MEDICINE

## 2021-11-16 RX ORDER — POTASSIUM CHLORIDE 750 MG/1
10 TABLET, FILM COATED, EXTENDED RELEASE ORAL 2 TIMES DAILY
Qty: 20 TABLET | Refills: 0 | Status: SHIPPED | OUTPATIENT
Start: 2021-11-16

## 2021-11-16 RX ORDER — AMANTADINE HYDROCHLORIDE 100 MG/1
100 CAPSULE, GELATIN COATED ORAL DAILY
Qty: 7 CAPSULE | Refills: 0 | Status: SHIPPED | OUTPATIENT
Start: 2021-11-16

## 2021-11-16 RX ORDER — MAGNESIUM SULFATE HEPTAHYDRATE 40 MG/ML
2 INJECTION, SOLUTION INTRAVENOUS ONCE
Status: COMPLETED | OUTPATIENT
Start: 2021-11-16 | End: 2021-11-16

## 2021-11-16 RX ORDER — SERTRALINE HYDROCHLORIDE 100 MG/1
100 TABLET, FILM COATED ORAL DAILY
COMMUNITY

## 2021-11-16 RX ORDER — POTASSIUM CHLORIDE 750 MG/1
40 CAPSULE, EXTENDED RELEASE ORAL ONCE
Status: COMPLETED | OUTPATIENT
Start: 2021-11-16 | End: 2021-11-16

## 2021-11-16 RX ORDER — ASPIRIN 81 MG/1
81 TABLET ORAL DAILY
COMMUNITY

## 2021-11-16 RX ORDER — LANOLIN ALCOHOL/MO/W.PET/CERES
3 CREAM (GRAM) TOPICAL DAILY
COMMUNITY

## 2021-11-16 RX ORDER — DIAZEPAM 10 MG/1
10 TABLET ORAL DAILY
COMMUNITY

## 2021-11-16 RX ORDER — AMANTADINE HYDROCHLORIDE 100 MG/1
100 CAPSULE, GELATIN COATED ORAL DAILY
Status: COMPLETED | OUTPATIENT
Start: 2021-11-16 | End: 2021-11-16

## 2021-11-16 RX ADMIN — AMANTADINE HYDROCHLORIDE 100 MG: 100 CAPSULE ORAL at 04:19

## 2021-11-16 RX ADMIN — POTASSIUM CHLORIDE 40 MEQ: 750 CAPSULE, EXTENDED RELEASE ORAL at 04:18

## 2021-11-16 RX ADMIN — MAGNESIUM SULFATE HEPTAHYDRATE 2 G: 40 INJECTION, SOLUTION INTRAVENOUS at 04:18

## 2021-11-16 NOTE — ED PROVIDER NOTES
Subjective   Patient presents emergency department with tremors of the upper extremities and present for the past 2 days.  He states that the tremors are secondary to his underlying Parkinson's disease, and he has been out of his amantadine for the past few days.  He states that the medication should be arriving at his house today.  He has no other complaints.          Delirium Tremens (DTS)  Severity:  Mild  Onset quality:  Gradual  Duration:  2 days  Timing:  Constant  Progression:  Waxing and waning  Chronicity:  Chronic  Relieved by:  Amantadine  Worsened by:  Nothing  Associated symptoms: no abdominal pain, no chest pain, no congestion, no cough, no diarrhea, no ear pain, no fatigue, no fever, no headaches, no myalgias, no nausea, no rash, no rhinorrhea, no shortness of breath, no sore throat, no vomiting and no wheezing        Review of Systems   Constitutional: Negative for appetite change, chills, diaphoresis, fatigue and fever.   HENT: Negative for congestion, ear discharge, ear pain, nosebleeds, rhinorrhea, sinus pressure, sore throat and trouble swallowing.    Eyes: Negative for discharge and redness.   Respiratory: Negative for apnea, cough, chest tightness, shortness of breath and wheezing.    Cardiovascular: Negative for chest pain.   Gastrointestinal: Negative for abdominal pain, diarrhea, nausea and vomiting.   Endocrine: Negative for polyuria.   Genitourinary: Negative for dysuria, frequency and urgency.   Musculoskeletal: Negative for myalgias and neck pain.   Skin: Negative for color change and rash.   Allergic/Immunologic: Negative for immunocompromised state.   Neurological: Positive for tremors. Negative for dizziness, seizures, syncope, weakness, light-headedness and headaches.   Hematological: Negative for adenopathy. Does not bruise/bleed easily.   Psychiatric/Behavioral: Negative for behavioral problems and confusion.   All other systems reviewed and are negative.      Past Medical History:    Diagnosis Date   • Depression    • Hyperlipidemia    • Hypertension    • Seizures (HCC)    • Tremors of nervous system        Allergies   Allergen Reactions   • Gabapentin Dizziness   • Hydrocodone Dizziness   • Simvastatin Dizziness   • Trazodone Anxiety       Past Surgical History:   Procedure Laterality Date   • BACK SURGERY     • BRAIN SURGERY         History reviewed. No pertinent family history.    Social History     Socioeconomic History   • Marital status:    Tobacco Use   • Smoking status: Current Every Day Smoker     Packs/day: 1.50     Types: Cigarettes   • Smokeless tobacco: Never Used   Vaping Use   • Vaping Use: Never used   Substance and Sexual Activity   • Alcohol use: No   • Drug use: No   • Sexual activity: Defer           Objective   Physical Exam  Vitals and nursing note reviewed.   Constitutional:       Appearance: He is well-developed.   HENT:      Head: Normocephalic and atraumatic.      Nose: Nose normal.   Eyes:      General: No scleral icterus.        Right eye: No discharge.         Left eye: No discharge.      Conjunctiva/sclera: Conjunctivae normal.      Pupils: Pupils are equal, round, and reactive to light.   Neck:      Trachea: No tracheal deviation.   Cardiovascular:      Rate and Rhythm: Normal rate and regular rhythm.      Heart sounds: Normal heart sounds. No murmur heard.      Pulmonary:      Effort: Pulmonary effort is normal. No respiratory distress.      Breath sounds: Normal breath sounds. No stridor. No wheezing or rales.   Abdominal:      General: Bowel sounds are normal. There is no distension.      Palpations: Abdomen is soft. There is no mass.      Tenderness: There is no abdominal tenderness. There is no guarding or rebound.   Musculoskeletal:      Cervical back: Normal range of motion and neck supple.   Skin:     General: Skin is warm and dry.      Findings: No erythema or rash.   Neurological:      Mental Status: He is alert and oriented to person, place,  and time.      Motor: Tremors: upper extremities.      Coordination: Coordination normal.   Psychiatric:         Behavior: Behavior normal.         Thought Content: Thought content normal.         Procedures           ED Course             Labs Reviewed   COMPREHENSIVE METABOLIC PANEL - Abnormal; Notable for the following components:       Result Value    Glucose 124 (*)     Potassium 2.9 (*)     CO2 31.0 (*)     BUN/Creatinine Ratio 5.8 (*)     All other components within normal limits    Narrative:     GFR Normal >60  Chronic Kidney Disease <60  Kidney Failure <15     CBC WITH AUTO DIFFERENTIAL - Abnormal; Notable for the following components:    MCHC 35.8 (*)     Eosinophil % 6.6 (*)     Eosinophils, Absolute 0.71 (*)     All other components within normal limits   MAGNESIUM - Normal   CBC AND DIFFERENTIAL    Narrative:     The following orders were created for panel order CBC & Differential.  Procedure                               Abnormality         Status                     ---------                               -----------         ------                     CBC Auto Differential[[id number]]        Abnormal            Final result                 Please view results for these tests on the individual orders.   EXTRA TUBES    Narrative:     The following orders were created for panel order Extra Tubes.  Procedure                               Abnormality         Status                     ---------                               -----------         ------                     Gold Top - SST[[id number]]                                   Final result                 Please view results for these tests on the individual orders.   GOLD TOP - SST       No orders to display                                       MDM    Final diagnoses:   Pill rolling tremors   Hypokalemia       ED Disposition  ED Disposition     ED Disposition Condition Comment    Discharge Stable           [facility] -  FAMILY MEDICINE  [facility]   [place] [address] [phone]  In 2 days           Medication List      Changed    levETIRAcetam 500 MG tablet  Commonly known as: KEPPRA  Take 1 tablet by mouth 2 (Two) Times a Day.  What changed: how much to take     * potassium chloride ER 20 MEQ tablet controlled-release ER tablet  Commonly known as: K-TAB  Take 1 tablet by mouth Daily.  What changed: how much to take     * potassium chloride 10 MEQ CR tablet  Take 1 tablet by mouth 2 (Two) Times a Day.  What changed: You were already taking a medication with the same name, and this prescription was added. Make sure you understand how and when to take each.         * This list has 2 medication(s) that are the same as other medications prescribed for you. Read the directions carefully, and ask your doctor or other care provider to review them with you.               Where to Get Your Medications      These medications were sent to [facility]pharm[facility] #[id number] - [place], [place] - [address] [place] [address] [phone]  - [phone] [address] [address]    Phone: [phone]   · amantadine 100 MG capsule  · potassium chloride 10 MEQ CR tablet          Dionicio Wayne MD  11/16/21 7776

## 2021-11-16 NOTE — ED NOTES
[employer] [facility] called for pt and ride set up. Pt seating in waiting room.      Ksenia Ventura RN  11/16/21 0544

## 2023-04-17 ENCOUNTER — HOSPITAL ENCOUNTER (EMERGENCY)
Facility: HOSPITAL | Age: 58
Discharge: HOME | End: 2023-04-17
Attending: EMERGENCY MEDICINE | Admitting: EMERGENCY MEDICINE
Payer: OTHER GOVERNMENT

## 2023-04-17 ENCOUNTER — APPOINTMENT (OUTPATIENT)
Dept: GENERAL RADIOLOGY | Facility: HOSPITAL | Age: 58
End: 2023-04-17
Payer: OTHER GOVERNMENT

## 2023-04-17 ENCOUNTER — APPOINTMENT (OUTPATIENT)
Dept: CT IMAGING | Facility: HOSPITAL | Age: 58
End: 2023-04-17
Payer: OTHER GOVERNMENT

## 2023-04-17 VITALS
HEART RATE: 94 BPM | HEIGHT: 68 IN | OXYGEN SATURATION: 93 % | TEMPERATURE: 97.7 F | SYSTOLIC BLOOD PRESSURE: 165 MMHG | DIASTOLIC BLOOD PRESSURE: 87 MMHG | BODY MASS INDEX: 28.79 KG/M2 | WEIGHT: 190 LBS | RESPIRATION RATE: 18 BRPM

## 2023-04-17 DIAGNOSIS — R55 SYNCOPE, UNSPECIFIED SYNCOPE TYPE: Primary | ICD-10-CM

## 2023-04-17 LAB
ALBUMIN SERPL-MCNC: 4.5 G/DL (ref 3.5–5.2)
ALBUMIN/GLOB SERPL: 1.5 G/DL
ALP SERPL-CCNC: 108 U/L (ref 39–117)
ALT SERPL W P-5'-P-CCNC: 34 U/L (ref 1–41)
ANION GAP SERPL CALCULATED.3IONS-SCNC: 12 MMOL/L (ref 5–15)
AST SERPL-CCNC: 26 U/L (ref 1–40)
BACTERIA UR QL AUTO: ABNORMAL /HPF
BASOPHILS # BLD AUTO: 0.13 10*3/MM3 (ref 0–0.2)
BASOPHILS NFR BLD AUTO: 0.8 % (ref 0–1.5)
BILIRUB SERPL-MCNC: 0.7 MG/DL (ref 0–1.2)
BILIRUB UR QL STRIP: NEGATIVE
BUN SERPL-MCNC: 6 MG/DL (ref 6–20)
BUN/CREAT SERPL: 6.3 (ref 7–25)
CALCIUM SPEC-SCNC: 9.3 MG/DL (ref 8.6–10.5)
CHLORIDE SERPL-SCNC: 102 MMOL/L (ref 98–107)
CK SERPL-CCNC: 134 U/L (ref 20–200)
CLARITY UR: CLEAR
CO2 SERPL-SCNC: 27 MMOL/L (ref 22–29)
COLOR UR: YELLOW
CREAT SERPL-MCNC: 0.96 MG/DL (ref 0.76–1.27)
DEPRECATED RDW RBC AUTO: 40.6 FL (ref 37–54)
EGFRCR SERPLBLD CKD-EPI 2021: 92.2 ML/MIN/1.73
EOSINOPHIL # BLD AUTO: 0.15 10*3/MM3 (ref 0–0.4)
EOSINOPHIL NFR BLD AUTO: 0.9 % (ref 0.3–6.2)
ERYTHROCYTE [DISTWIDTH] IN BLOOD BY AUTOMATED COUNT: 12.4 % (ref 12.3–15.4)
GEN 5 2HR TROPONIN T REFLEX: 12 NG/L
GLOBULIN UR ELPH-MCNC: 3.1 GM/DL
GLUCOSE SERPL-MCNC: 99 MG/DL (ref 65–99)
GLUCOSE UR STRIP-MCNC: NEGATIVE MG/DL
GRAN CASTS URNS QL MICRO: ABNORMAL /LPF
HCT VFR BLD AUTO: 47.6 % (ref 37.5–51)
HGB BLD-MCNC: 16.8 G/DL (ref 13–17.7)
HGB UR QL STRIP.AUTO: NEGATIVE
HYALINE CASTS UR QL AUTO: ABNORMAL /LPF
IMM GRANULOCYTES # BLD AUTO: 0.08 10*3/MM3 (ref 0–0.05)
IMM GRANULOCYTES NFR BLD AUTO: 0.5 % (ref 0–0.5)
KETONES UR QL STRIP: ABNORMAL
LEUKOCYTE ESTERASE UR QL STRIP.AUTO: NEGATIVE
LYMPHOCYTES # BLD AUTO: 2.22 10*3/MM3 (ref 0.7–3.1)
LYMPHOCYTES NFR BLD AUTO: 14 % (ref 19.6–45.3)
MAGNESIUM SERPL-MCNC: 2.1 MG/DL (ref 1.6–2.6)
MCH RBC QN AUTO: 31.9 PG (ref 26.6–33)
MCHC RBC AUTO-ENTMCNC: 35.3 G/DL (ref 31.5–35.7)
MCV RBC AUTO: 90.3 FL (ref 79–97)
MONOCYTES # BLD AUTO: 0.88 10*3/MM3 (ref 0.1–0.9)
MONOCYTES NFR BLD AUTO: 5.5 % (ref 5–12)
NEUTROPHILS NFR BLD AUTO: 12.4 10*3/MM3 (ref 1.7–7)
NEUTROPHILS NFR BLD AUTO: 78.3 % (ref 42.7–76)
NITRITE UR QL STRIP: NEGATIVE
NRBC BLD AUTO-RTO: 0 /100 WBC (ref 0–0.2)
NT-PROBNP SERPL-MCNC: 87 PG/ML (ref 0–900)
PH UR STRIP.AUTO: 6 [PH] (ref 5–9)
PLATELET # BLD AUTO: 233 10*3/MM3 (ref 140–450)
PMV BLD AUTO: 9.9 FL (ref 6–12)
POTASSIUM SERPL-SCNC: 2.9 MMOL/L (ref 3.5–5.2)
PROT SERPL-MCNC: 7.6 G/DL (ref 6–8.5)
PROT UR QL STRIP: ABNORMAL
RBC # BLD AUTO: 5.27 10*6/MM3 (ref 4.14–5.8)
RBC # UR STRIP: ABNORMAL /HPF
REF LAB TEST METHOD: ABNORMAL
SODIUM SERPL-SCNC: 141 MMOL/L (ref 136–145)
SP GR UR STRIP: 1.02 (ref 1–1.03)
SPERM URNS QL MICRO: ABNORMAL /HPF
SQUAMOUS #/AREA URNS HPF: ABNORMAL /HPF
TROPONIN T DELTA: 0 NG/L
TROPONIN T SERPL HS-MCNC: 12 NG/L
UROBILINOGEN UR QL STRIP: ABNORMAL
WBC # UR STRIP: ABNORMAL /HPF
WBC CASTS #/AREA URNS LPF: ABNORMAL /LPF
WBC NRBC COR # BLD: 15.86 10*3/MM3 (ref 3.4–10.8)

## 2023-04-17 PROCEDURE — 36415 COLL VENOUS BLD VENIPUNCTURE: CPT

## 2023-04-17 PROCEDURE — 84484 ASSAY OF TROPONIN QUANT: CPT

## 2023-04-17 PROCEDURE — 83880 ASSAY OF NATRIURETIC PEPTIDE: CPT

## 2023-04-17 PROCEDURE — 71045 X-RAY EXAM CHEST 1 VIEW: CPT

## 2023-04-17 PROCEDURE — 80053 COMPREHEN METABOLIC PANEL: CPT

## 2023-04-17 PROCEDURE — 99284 EMERGENCY DEPT VISIT MOD MDM: CPT

## 2023-04-17 PROCEDURE — 93005 ELECTROCARDIOGRAM TRACING: CPT

## 2023-04-17 PROCEDURE — 85025 COMPLETE CBC W/AUTO DIFF WBC: CPT

## 2023-04-17 PROCEDURE — 82550 ASSAY OF CK (CPK): CPT

## 2023-04-17 PROCEDURE — 83735 ASSAY OF MAGNESIUM: CPT

## 2023-04-17 PROCEDURE — 81001 URINALYSIS AUTO W/SCOPE: CPT

## 2023-04-17 PROCEDURE — 70450 CT HEAD/BRAIN W/O DYE: CPT

## 2023-04-18 LAB
QT INTERVAL: 412 MS
QTC INTERVAL: 457 MS

## 2023-04-18 NOTE — DISCHARGE INSTRUCTIONS
Continue to take home medications as prescribed. Follow up with PCP as scheduled for this Friday as per patient. Return to ED if symptoms worsen.

## 2023-04-18 NOTE — ED PROVIDER NOTES
Subjective   History of Present Illness  Quoc Conrad Jr. Is a 57 year-old male with CMH of Seizures, Hypertension, Hyperlipidemia, and PTSD who presents with syncope. Patient states he was mowing his lawn, then lost consciousness, unsure of duration. He was found by neighbor. No loss of bowel / bladder control. Cannot confirm absence of jerking movements and foaming during episode. Unsure about hitting his head. Somewhat confused after regaining consciousness. Previous seizure episode was many years ago, on Keppra 1500 mg bid. [facility] , [facility] days ago. No recent changes to his medications. Endorses bilateral  hand  tremors which he associates with medication side effects. He denies headache, vision changes, numbness, tingling, chest pain, shortness of breath, or palpitations.         Review of Systems   Constitutional: Negative.    HENT: Negative.    Eyes: Negative.    Respiratory: Negative.    Cardiovascular: Negative.  Negative for chest pain.   Gastrointestinal: Negative.    Endocrine: Negative.    Genitourinary: Negative.    Musculoskeletal: Negative.    Skin: Negative.    Allergic/Immunologic: Negative.    Neurological: Positive for tremors and syncope.   Hematological: Negative.    Psychiatric/Behavioral: Negative.        Past Medical History:   Diagnosis Date   • Depression    • Hyperlipidemia    • Hypertension    • Seizures    • Tremors of nervous system        Allergies   Allergen Reactions   • Gabapentin Dizziness   • Hydrocodone Dizziness   • Simvastatin Dizziness   • Trazodone Anxiety       Past Surgical History:   Procedure Laterality Date   • BACK SURGERY     • BRAIN SURGERY         No family history on file.    Social History     Socioeconomic History   • Marital status:    Tobacco Use   • Smoking status: Every Day     Packs/day: 1.50     Types: Cigarettes   • Smokeless tobacco: Never   Vaping Use   • Vaping Use: Never used   Substance and  Sexual Activity   • Alcohol use: No   • Drug use: No   • Sexual activity: Defer           Objective      Vitals:    04/17/23 1958 04/17/23 1959 04/17/23 2000 04/17/23 2121   BP: 145/92 153/98 153/90 165/87   Patient Position: (S) Lying (S) Sitting (S) Standing    Pulse:  78 94    Resp:       Temp:       TempSrc:       SpO2:    93%   Weight:       Height:                   Physical Exam  Vitals reviewed.   Constitutional:       Appearance: Normal appearance.   HENT:      Head: Normocephalic and atraumatic.      Right Ear: Tympanic membrane normal.      Left Ear: Tympanic membrane normal.      Nose: Nose normal.      Mouth/Throat:      Mouth: Mucous membranes are moist.   Cardiovascular:      Rate and Rhythm: Normal rate and regular rhythm.      Pulses: Normal pulses.      Heart sounds: Normal heart sounds.   Pulmonary:      Effort: Pulmonary effort is normal.      Breath sounds: Normal breath sounds.   Abdominal:      General: Bowel sounds are normal.      Palpations: Abdomen is soft.   Musculoskeletal:         General: Normal range of motion.      Cervical back: Normal range of motion.   Skin:     General: Skin is warm and dry.   Neurological:      General: No focal deficit present.      Mental Status: He is alert and oriented to person, place, and time.      Cranial Nerves: No cranial nerve deficit.      Sensory: No sensory deficit.      Comments: B/L hand tremors at rest. Normal neurology exam   Psychiatric:         Mood and Affect: Mood normal.         Behavior: Behavior normal.         Procedures        ED Course          XR Chest 1 View   Final Result      CT Head Without Contrast   Final Result   No acute intracranial process.                   Medical Decision Making  Quoc Ricky Conrad Jr.a  57 year-old male here with syncope.Head CT showed no acute intracranial process. All labs grossly normal. Elevated white count could be due to medication side effects given no fever, no UTI, or other signs of  infection. Chest x-ray grossly normal.  Follow up with PCP, Ms. Ceballos, as previously scheduled for this Friday.     Syncope, unspecified syncope type: acute illness or injury  Amount and/or Complexity of Data Reviewed  Labs: ordered.  Radiology: ordered.  ECG/medicine tests: ordered.          Final diagnoses:   Syncope, unspecified syncope type       ED Disposition  ED Disposition     ED Disposition   Discharge    Condition   Stable    Comment   --             Provider, No Known  [facility] [id number]      Roberto [facility] [place] [facility] Ms[facility] Ceballos.         Medication List      Changed    levETIRAcetam 500 MG tablet  Commonly known as: KEPPRA  Take 1 tablet by mouth 2 (Two) Times a Day.  What changed: how much to take     * potassium chloride ER 20 MEQ tablet controlled-release ER tablet  Commonly known as: K-TAB  Take 1 tablet by mouth Daily.  What changed: how much to take     * potassium chloride 10 MEQ CR tablet  Take 1 tablet by mouth 2 (Two) Times a Day.  What changed: Another medication with the same name was changed. Make sure you understand how and when to take each.         * This list has 2 medication(s) that are the same as other medications prescribed for you. Read the directions carefully, and ask your doctor or other care provider to review them with you.                 Earnest Pena MD  Resident  04/17/23 5616